# Patient Record
Sex: FEMALE | Race: BLACK OR AFRICAN AMERICAN | NOT HISPANIC OR LATINO | Employment: OTHER | URBAN - METROPOLITAN AREA
[De-identification: names, ages, dates, MRNs, and addresses within clinical notes are randomized per-mention and may not be internally consistent; named-entity substitution may affect disease eponyms.]

---

## 2022-01-18 ENCOUNTER — APPOINTMENT (OUTPATIENT)
Dept: GENERAL RADIOLOGY | Facility: HOSPITAL | Age: 72
End: 2022-01-18

## 2022-01-18 ENCOUNTER — HOSPITAL ENCOUNTER (INPATIENT)
Facility: HOSPITAL | Age: 72
LOS: 2 days | Discharge: NURSING, REHAB OR LONG-TERM CARE | End: 2022-01-20
Attending: EMERGENCY MEDICINE | Admitting: INTERNAL MEDICINE

## 2022-01-18 DIAGNOSIS — N18.6 CHRONIC KIDNEY DISEASE WITH END STAGE RENAL FAILURE ON DIALYSIS: ICD-10-CM

## 2022-01-18 DIAGNOSIS — E87.5 HYPERKALEMIA: Primary | ICD-10-CM

## 2022-01-18 DIAGNOSIS — Z99.2 CHRONIC KIDNEY DISEASE WITH END STAGE RENAL FAILURE ON DIALYSIS: ICD-10-CM

## 2022-01-18 DIAGNOSIS — J18.9 PNEUMONIA OF BOTH LUNGS DUE TO INFECTIOUS ORGANISM, UNSPECIFIED PART OF LUNG: ICD-10-CM

## 2022-01-18 DIAGNOSIS — Z78.9 DECREASED ACTIVITIES OF DAILY LIVING (ADL): ICD-10-CM

## 2022-01-18 DIAGNOSIS — R26.2 DIFFICULTY IN WALKING: ICD-10-CM

## 2022-01-18 LAB
ALBUMIN SERPL-MCNC: 3 G/DL (ref 3.5–5.2)
ALBUMIN/GLOB SERPL: 0.6 G/DL
ALP SERPL-CCNC: 81 U/L (ref 39–117)
ALT SERPL W P-5'-P-CCNC: 18 U/L (ref 1–33)
ANION GAP SERPL CALCULATED.3IONS-SCNC: 15.1 MMOL/L (ref 5–15)
AST SERPL-CCNC: 28 U/L (ref 1–32)
BASOPHILS # BLD AUTO: 0.03 10*3/MM3 (ref 0–0.2)
BASOPHILS NFR BLD AUTO: 0.5 % (ref 0–1.5)
BILIRUB SERPL-MCNC: 0.2 MG/DL (ref 0–1.2)
BUN SERPL-MCNC: 72 MG/DL (ref 8–23)
BUN/CREAT SERPL: 8.6 (ref 7–25)
CALCIUM SPEC-SCNC: 8.7 MG/DL (ref 8.6–10.5)
CHLORIDE SERPL-SCNC: 97 MMOL/L (ref 98–107)
CO2 SERPL-SCNC: 21.9 MMOL/L (ref 22–29)
CREAT SERPL-MCNC: 8.36 MG/DL (ref 0.57–1)
D-LACTATE SERPL-SCNC: 1 MMOL/L (ref 0.5–2)
DEPRECATED RDW RBC AUTO: 64.9 FL (ref 37–54)
EOSINOPHIL # BLD AUTO: 0.16 10*3/MM3 (ref 0–0.4)
EOSINOPHIL NFR BLD AUTO: 2.5 % (ref 0.3–6.2)
ERYTHROCYTE [DISTWIDTH] IN BLOOD BY AUTOMATED COUNT: 17.9 % (ref 12.3–15.4)
GFR SERPL CREATININE-BSD FRML MDRD: 6 ML/MIN/1.73
GFR SERPL CREATININE-BSD FRML MDRD: ABNORMAL ML/MIN/{1.73_M2}
GLOBULIN UR ELPH-MCNC: 5.2 GM/DL
GLUCOSE SERPL-MCNC: 101 MG/DL (ref 65–99)
HCT VFR BLD AUTO: 31 % (ref 34–46.6)
HGB BLD-MCNC: 9.5 G/DL (ref 12–15.9)
HOLD SPECIMEN: NORMAL
HOLD SPECIMEN: NORMAL
IMM GRANULOCYTES # BLD AUTO: 0.02 10*3/MM3 (ref 0–0.05)
IMM GRANULOCYTES NFR BLD AUTO: 0.3 % (ref 0–0.5)
LYMPHOCYTES # BLD AUTO: 1.16 10*3/MM3 (ref 0.7–3.1)
LYMPHOCYTES NFR BLD AUTO: 18.2 % (ref 19.6–45.3)
MAGNESIUM SERPL-MCNC: 1.9 MG/DL (ref 1.6–2.4)
MCH RBC QN AUTO: 30 PG (ref 26.6–33)
MCHC RBC AUTO-ENTMCNC: 30.6 G/DL (ref 31.5–35.7)
MCV RBC AUTO: 97.8 FL (ref 79–97)
MONOCYTES # BLD AUTO: 0.61 10*3/MM3 (ref 0.1–0.9)
MONOCYTES NFR BLD AUTO: 9.6 % (ref 5–12)
NEUTROPHILS NFR BLD AUTO: 4.4 10*3/MM3 (ref 1.7–7)
NEUTROPHILS NFR BLD AUTO: 68.9 % (ref 42.7–76)
NRBC BLD AUTO-RTO: 0 /100 WBC (ref 0–0.2)
PLATELET # BLD AUTO: 475 10*3/MM3 (ref 140–450)
PMV BLD AUTO: 9.5 FL (ref 6–12)
POTASSIUM SERPL-SCNC: 7.1 MMOL/L (ref 3.5–5.2)
PROT SERPL-MCNC: 8.2 G/DL (ref 6–8.5)
RBC # BLD AUTO: 3.17 10*6/MM3 (ref 3.77–5.28)
SARS-COV-2 RNA PNL SPEC NAA+PROBE: DETECTED
SODIUM SERPL-SCNC: 134 MMOL/L (ref 136–145)
TROPONIN T SERPL-MCNC: 0.25 NG/ML (ref 0–0.03)
WBC NRBC COR # BLD: 6.38 10*3/MM3 (ref 3.4–10.8)
WHOLE BLOOD HOLD SPECIMEN: NORMAL
WHOLE BLOOD HOLD SPECIMEN: NORMAL

## 2022-01-18 PROCEDURE — 80053 COMPREHEN METABOLIC PANEL: CPT | Performed by: EMERGENCY MEDICINE

## 2022-01-18 PROCEDURE — 25010000002 CEFEPIME PER 500 MG: Performed by: EMERGENCY MEDICINE

## 2022-01-18 PROCEDURE — 83735 ASSAY OF MAGNESIUM: CPT | Performed by: EMERGENCY MEDICINE

## 2022-01-18 PROCEDURE — 84484 ASSAY OF TROPONIN QUANT: CPT | Performed by: EMERGENCY MEDICINE

## 2022-01-18 PROCEDURE — 85025 COMPLETE CBC W/AUTO DIFF WBC: CPT | Performed by: EMERGENCY MEDICINE

## 2022-01-18 PROCEDURE — 5A1D70Z PERFORMANCE OF URINARY FILTRATION, INTERMITTENT, LESS THAN 6 HOURS PER DAY: ICD-10-PCS | Performed by: INTERNAL MEDICINE

## 2022-01-18 PROCEDURE — 94640 AIRWAY INHALATION TREATMENT: CPT

## 2022-01-18 PROCEDURE — U0004 COV-19 TEST NON-CDC HGH THRU: HCPCS | Performed by: EMERGENCY MEDICINE

## 2022-01-18 PROCEDURE — 83605 ASSAY OF LACTIC ACID: CPT | Performed by: EMERGENCY MEDICINE

## 2022-01-18 PROCEDURE — 71045 X-RAY EXAM CHEST 1 VIEW: CPT

## 2022-01-18 PROCEDURE — 99223 1ST HOSP IP/OBS HIGH 75: CPT | Performed by: INTERNAL MEDICINE

## 2022-01-18 PROCEDURE — 82962 GLUCOSE BLOOD TEST: CPT

## 2022-01-18 PROCEDURE — 93005 ELECTROCARDIOGRAM TRACING: CPT | Performed by: EMERGENCY MEDICINE

## 2022-01-18 PROCEDURE — 25010000002 CALCIUM GLUCONATE-NACL 1-0.675 GM/50ML-% SOLUTION: Performed by: EMERGENCY MEDICINE

## 2022-01-18 PROCEDURE — 87040 BLOOD CULTURE FOR BACTERIA: CPT | Performed by: EMERGENCY MEDICINE

## 2022-01-18 PROCEDURE — 94799 UNLISTED PULMONARY SVC/PX: CPT

## 2022-01-18 PROCEDURE — 99284 EMERGENCY DEPT VISIT MOD MDM: CPT

## 2022-01-18 RX ORDER — ACETAMINOPHEN 325 MG/1
650 TABLET ORAL EVERY 4 HOURS PRN
Status: DISCONTINUED | OUTPATIENT
Start: 2022-01-18 | End: 2022-01-21 | Stop reason: HOSPADM

## 2022-01-18 RX ORDER — ONDANSETRON 2 MG/ML
4 INJECTION INTRAMUSCULAR; INTRAVENOUS EVERY 6 HOURS PRN
Status: DISCONTINUED | OUTPATIENT
Start: 2022-01-18 | End: 2022-01-21 | Stop reason: HOSPADM

## 2022-01-18 RX ORDER — ACETAMINOPHEN 325 MG/1
650 TABLET ORAL EVERY 4 HOURS PRN
COMMUNITY

## 2022-01-18 RX ORDER — HEPARIN SODIUM 5000 [USP'U]/ML
5000 INJECTION, SOLUTION INTRAVENOUS; SUBCUTANEOUS EVERY 8 HOURS SCHEDULED
Status: DISCONTINUED | OUTPATIENT
Start: 2022-01-18 | End: 2022-01-18

## 2022-01-18 RX ORDER — AMOXICILLIN 250 MG
2 CAPSULE ORAL 2 TIMES DAILY
Status: DISCONTINUED | OUTPATIENT
Start: 2022-01-18 | End: 2022-01-21 | Stop reason: HOSPADM

## 2022-01-18 RX ORDER — HYDROXYZINE PAMOATE 25 MG/1
25 CAPSULE ORAL EVERY 6 HOURS PRN
COMMUNITY

## 2022-01-18 RX ORDER — ALBUTEROL SULFATE 2.5 MG/3ML
2.5 SOLUTION RESPIRATORY (INHALATION) ONCE
Status: COMPLETED | OUTPATIENT
Start: 2022-01-18 | End: 2022-01-18

## 2022-01-18 RX ORDER — ASPIRIN 81 MG/1
81 TABLET, CHEWABLE ORAL DAILY
COMMUNITY

## 2022-01-18 RX ORDER — TRAMADOL HYDROCHLORIDE 50 MG/1
50 TABLET ORAL DAILY PRN
COMMUNITY

## 2022-01-18 RX ORDER — ASPIRIN 81 MG/1
81 TABLET, CHEWABLE ORAL DAILY
Status: DISCONTINUED | OUTPATIENT
Start: 2022-01-18 | End: 2022-01-21 | Stop reason: HOSPADM

## 2022-01-18 RX ORDER — SODIUM CHLORIDE 0.9 % (FLUSH) 0.9 %
10 SYRINGE (ML) INJECTION AS NEEDED
Status: DISCONTINUED | OUTPATIENT
Start: 2022-01-18 | End: 2022-01-21 | Stop reason: HOSPADM

## 2022-01-18 RX ORDER — FAMOTIDINE 20 MG/1
40 TABLET, FILM COATED ORAL DAILY
Status: DISCONTINUED | OUTPATIENT
Start: 2022-01-18 | End: 2022-01-21 | Stop reason: HOSPADM

## 2022-01-18 RX ORDER — SODIUM CHLORIDE 0.9 % (FLUSH) 0.9 %
10 SYRINGE (ML) INJECTION AS NEEDED
Status: DISCONTINUED | OUTPATIENT
Start: 2022-01-18 | End: 2022-01-18

## 2022-01-18 RX ORDER — POLYETHYLENE GLYCOL 3350 17 G/17G
17 POWDER, FOR SOLUTION ORAL DAILY PRN
Status: DISCONTINUED | OUTPATIENT
Start: 2022-01-18 | End: 2022-01-21 | Stop reason: HOSPADM

## 2022-01-18 RX ORDER — POLYETHYLENE GLYCOL 3350 17 G/17G
17 POWDER, FOR SOLUTION ORAL DAILY
Status: DISCONTINUED | OUTPATIENT
Start: 2022-01-18 | End: 2022-01-21 | Stop reason: HOSPADM

## 2022-01-18 RX ORDER — CALCIUM GLUCONATE 20 MG/ML
1 INJECTION, SOLUTION INTRAVENOUS ONCE
Status: COMPLETED | OUTPATIENT
Start: 2022-01-18 | End: 2022-01-18

## 2022-01-18 RX ORDER — BISACODYL 10 MG
10 SUPPOSITORY, RECTAL RECTAL DAILY PRN
Status: DISCONTINUED | OUTPATIENT
Start: 2022-01-18 | End: 2022-01-21 | Stop reason: HOSPADM

## 2022-01-18 RX ORDER — ATORVASTATIN CALCIUM 40 MG/1
40 TABLET, FILM COATED ORAL DAILY
COMMUNITY

## 2022-01-18 RX ORDER — DEXTROSE MONOHYDRATE 25 G/50ML
25 INJECTION, SOLUTION INTRAVENOUS ONCE
Status: COMPLETED | OUTPATIENT
Start: 2022-01-18 | End: 2022-01-18

## 2022-01-18 RX ORDER — CEFEPIME 1 G/50ML
2 INJECTION, SOLUTION INTRAVENOUS ONCE
Status: COMPLETED | OUTPATIENT
Start: 2022-01-18 | End: 2022-01-18

## 2022-01-18 RX ORDER — CHOLECALCIFEROL (VITAMIN D3) 125 MCG
5 CAPSULE ORAL NIGHTLY PRN
Status: DISCONTINUED | OUTPATIENT
Start: 2022-01-18 | End: 2022-01-21 | Stop reason: HOSPADM

## 2022-01-18 RX ORDER — CALCIUM ACETATE 667 MG/1
1334 CAPSULE ORAL 3 TIMES DAILY
Status: DISCONTINUED | OUTPATIENT
Start: 2022-01-18 | End: 2022-01-21 | Stop reason: HOSPADM

## 2022-01-18 RX ORDER — CALCIUM ACETATE 667 MG/1
1334 CAPSULE ORAL 3 TIMES DAILY
COMMUNITY

## 2022-01-18 RX ORDER — ATORVASTATIN CALCIUM 40 MG/1
40 TABLET, FILM COATED ORAL NIGHTLY
Status: DISCONTINUED | OUTPATIENT
Start: 2022-01-18 | End: 2022-01-21 | Stop reason: HOSPADM

## 2022-01-18 RX ORDER — SODIUM CHLORIDE 0.9 % (FLUSH) 0.9 %
10 SYRINGE (ML) INJECTION EVERY 12 HOURS SCHEDULED
Status: DISCONTINUED | OUTPATIENT
Start: 2022-01-18 | End: 2022-01-21 | Stop reason: HOSPADM

## 2022-01-18 RX ORDER — BISACODYL 5 MG/1
5 TABLET, DELAYED RELEASE ORAL DAILY PRN
Status: DISCONTINUED | OUTPATIENT
Start: 2022-01-18 | End: 2022-01-21 | Stop reason: HOSPADM

## 2022-01-18 RX ADMIN — CEFEPIME 2 G: 1 INJECTION, SOLUTION INTRAVENOUS at 16:50

## 2022-01-18 RX ADMIN — ACETAMINOPHEN 650 MG: 325 TABLET ORAL at 21:47

## 2022-01-18 RX ADMIN — ALBUTEROL SULFATE 2.5 MG: 2.5 SOLUTION RESPIRATORY (INHALATION) at 16:06

## 2022-01-18 RX ADMIN — DEXTROSE MONOHYDRATE 25 G: 25 INJECTION, SOLUTION INTRAVENOUS at 16:12

## 2022-01-18 RX ADMIN — CALCIUM GLUCONATE 1 G: 20 INJECTION, SOLUTION INTRAVENOUS at 16:11

## 2022-01-18 NOTE — CONSULTS
[facility]   Consult Note    Patient Name: Shakira Hopson  : 1950  MRN: [id number]  Primary Care Physician:  Gopal Umana MD  Referring Physician: No ref. provider found  Date of admission: 2022    Subjective   Subjective     Reason for Consult/ Chief Complaint: Hyperkalemia    HPI:  Shakira Hopson is a 71 y.o. female with ESRD on hemodialysis.  She has been ESRD greater than 2 years based on reviewed records.  She is our established patient at [facility] dialysis [facility] and was sent for acute rehab at [facility].  Per [facility] staff her last hemodialysis treatment was 2021 prior to coming to rehab.  Dialysis was ordered and scheduled for yesterday, she did not get dialysis and she was sent to the ER for increased confusion and abdominal pain. She was found to have potassium of 7.1 in ER. She received emergent hyperkalemic treatment.  X-ray resulted findings consistent with multifocal pneumonia, right greater than left.  She is confused in the emergency room and when asked questions she is unable to answer.  She is intermittently moaning.    Review of Systems  Unable to obtain due to confusion and mental status.    Personal History     Past Medical History:   Diagnosis Date   • Anemia    • CHF (congestive heart failure) (HCC)    • Coronary artery disease    • Dementia (HCC)    • Diabetes mellitus (HCC)    • End stage renal disease (HCC)    • Hemodialysis patient (HCC)    • Hyperlipidemia    • Hypertension    • Myocardial infarction (HCC)    • Pneumonia        History reviewed. No pertinent surgical history.    Family History: family history is not on file. Otherwise pertinent FHx was reviewed and not pertinent to current issue.    Social History:  reports that she has never smoked. She has never used smokeless tobacco. She reports that she does not drink alcohol and does not use drugs.    Home Medications:  Canagliflozin, Polyethylene Glycol 3350,  acetaminophen, aspirin, atorvastatin, calcium acetate, epoetin saba, hydrOXYzine pamoate, insulin aspart, metoprolol tartrate, sacubitril-valsartan, and traMADol    Allergies:  Allergies   Allergen Reactions   • Codeine Unknown - Low Severity   • Pork-Derived Products Unknown - Low Severity       Objective    Objective     Vitals:   Temp:  [98.8 °F (37.1 °C)-99.5 °F (37.5 °C)] 98.8 °F (37.1 °C)  Heart Rate:  [] 103  Resp:  [16-25] 16  BP: (118-149)/(41-70) 118/41  Flow (L/min):  [3] 3    Physical Exam:             Constitutional:       Awake, disoriented, not answering questions.    Eyes:                       PERRLA, sclerae anicteric, no conjunctival injection   HEENT:                   Moist mucous membranes, no nasal or eye discharge, no throat congestion   Neck:                      Supple, no thyromegaly, no lymphadenopathy, trachea midline, no elevated JVD   Respiratory:           Clear to auscultation bilaterally, nonlabored respirations    Cardiovascular:     RRR, no murmurs, rubs, or gallops, palpable pedal pulses bilaterally, No bilateral ankle edema   Gastrointestinal:   Positive bowel sounds, soft, nontender, non-distended, no organomegaly   Musculoskeletal:  No clubbing or cyanosis to extremities, muscle wasting, joint swelling, muscle weakness   Neurologic:            Confused, strength symmetric in all extremities, Cranial Nerves grossly intact to confrontation, speech clear   Skin:                      No rashes, bruising, skin ulcers, petechiae or ecchymosis    Result Review    Result Review:  I have personally reviewed the results from the time of this admission to 1/19/2022 08:22 EST and agree with these findings:  [x]  Laboratory  [x]  Microbiology  [x]  Radiology  [x]  EKG/Telemetry   [x]  Cardiology/Vascular   []  Pathology  []  Old records  []  Other:      Assessment/Plan   Assessment / Plan     Active Hospital Problems:  Active Hospital Problems    Diagnosis    • Pneumonia due to  COVID-19 virus    • ESRD (end stage renal disease) on dialysis (HCC)    • Hyperkalemia    • Pneumonia        Plan:   Stat hemodialysis  Start Decadron  Continue IV antibiotics.    Labs tomorrow. '    Electronically signed by Juana Sauceda MD, 01/18/22, 3:47 PM EST.

## 2022-01-18 NOTE — H&P
[facility] HISTORY AND PHYSICAL  Date: 1/18/2022   Patient Name: Shakira Hopson  Primary Care Physician:  Gopal Umana MD  Date of admission: 1/18/2022    Subjective   Subjective     Chief Complaint: Hyperkalemia    HPI:    Shakira Hopson is a 71 y.o. female who is a chronic dialysis patient in [place] was at acute rehab, difficulty with dialysis staff, detected to have an elevated temperature and transferred to the emergency department.  Overdue for dialysis.  No other current history available, I am unable to find the paper chart and the patient is not oriented.  Per the ED physician the patient was recently mated to a facility for pneumonia but unclear what facility.  Unable to obtain further history.      Personal History     Past Medical History:  Past Medical History:   Diagnosis Date   • Anemia    • CHF (congestive heart failure) (HCC)    • Coronary artery disease    • Dementia (HCC)    • Diabetes mellitus (HCC)    • End stage renal disease (HCC)    • Hemodialysis patient (HCC)    • Hyperlipidemia    • Hypertension    • Myocardial infarction (HCC)    • Pneumonia          Past Surgical History:  Patient confused, unable to obtain    Family History:   Patient is confused, unable to obtain    Social History:   Patient is confused, unable to obtain    Home Medications:  Canagliflozin, Polyethylene Glycol 3350, acetaminophen, aspirin, atorvastatin, calcium acetate, epoetin saba, hydrOXYzine pamoate, insulin aspart, metoprolol tartrate, sacubitril-valsartan, and traMADol    Allergies:  Allergies   Allergen Reactions   • Codeine Unknown - Low Severity   • Pork-Derived Products Unknown - Low Severity       Review of Systems   Patient is confused, denies cough fever shortness of breath but is not a reliable historian.  14 point review of systems obtained and negative except as noted in HPI    Objective   Objective     Vitals:   Temp:  [99.1 °F (37.3 °C)] 99.1 °F (37.3 °C)  Heart  Rate:  [90-96] 90  Resp:  [20-25] 20  BP: (128-145)/(61-67) 128/63  Flow (L/min):  [3] 3    Physical Exam   General: NAD, WDWN elderly female lying on the bed comfortably  Eyes: Clear conjunctiva, PERRL EOMI  ENMT: mmm, normal oropharynx  Resp: CTAB, no wrr, good effort no dyspnea  CV: RRR no mrg, no LE edema, right upper extremity fistula with palpable thrill  GI: abdomen is soft, NT, ND, +bs  Neuro: Moves all extremities spontaneously, sensation intact  Psych: Alert, oriented to person but not place or time or situation      Result Review    Result Review:  I have personally reviewed the results from the time of this admission to 1/18/2022 17:51 EST and agree with these findings:  [x]  Laboratory  Potassium 7.1  Hemoglobin 9.5  [x]  Microbiology  COVID swab pending  [x]  Radiology  Chest x-ray: Multifocal pneumonia right greater than left  [x]  EKG/Telemetry sinus rhythm  []  Cardiology/Vascular   []  Pathology  []  Old records  []  Other:      Assessment/Plan   Assessment / Plan     Assessment:   Life-threatening hyperkalemia  ESRD overdue for dialysis  Multifocal pneumonia  Dementia with confusion  Diabetes  CHF    Plan:     Nephrology consulted from the emergency department, planning for emergent hemodialysis  Received empiric vancomycin and cefepime in the emergency department, reassess tomorrow before redosing  COVID-19 swab, airborne isolation until returns  [facility]  Resume home aspirin, statin, metoprolol, Entresto  Resume home Phos binders  Sliding-scale insulin for now, holding Invokana     DVT ppx: Heparin  Diet: Diabetic renal  Discussed with bedside RN and ER physician    Admission Status:  I believe this patient meets inpatient status.    Electronically signed by Vilma Elizondo MD, 01/18/22, 5:51 PM EST.

## 2022-01-18 NOTE — ED PROVIDER NOTES
Time: 2:59 PM EST  Arrived by: ambulance  Chief Complaint: Low-grade temperature, increased confusion  History provided by: [facility]  History is limited by: Poor historian    History of Present Illness:  Patient is a 71 y.o. year old female that presents to the emergency department from [facility] rehab where she was transferred from another hospital on January 15.  Patient admitted in the hospital for pneumonia.  Patient is also a dialysis patient.  The nursing home sent the patient today to the emergency department due to a low-grade fever of 99.9 with increased confusion.  They state the patient has not had dialysis since she is arrived there.  She was supposed to have it yesterday and the dialysis team did not come and again today so they felt they should send her to the emergency department.  Patient is alert but unable to answer any questions.                   HPI    Similar Symptoms Previously:  unknown  Recently seen: Recent hospitalization      Patient Care Team  Primary Care Provider: Gopal Umana MD    Past Medical History:     Allergies   Allergen Reactions   • Codeine Unknown - Low Severity   • Pork-Derived Products Unknown - Low Severity     Past Medical History:   Diagnosis Date   • Anemia    • CHF (congestive heart failure) (HCC)    • Coronary artery disease    • Dementia (HCC)    • Diabetes mellitus (HCC)    • End stage renal disease (HCC)    • Hemodialysis patient (HCC)    • Hyperlipidemia    • Hypertension    • Myocardial infarction (HCC)    • Pneumonia      History reviewed. No pertinent surgical history.  History reviewed. No pertinent family history.    Home Medications:  Prior to Admission medications    Medication Sig Start Date End Date Taking? Authorizing Provider   acetaminophen (TYLENOL) 325 MG tablet Take 650 mg by mouth Every 4 (Four) Hours As Needed for Mild Pain .   Yes Provider, MD Lorenzo   aspirin 81 MG chewable tablet Chew 81  "mg Daily.   Yes Lorenzo Mulligan MD   atorvastatin (LIPITOR) 40 MG tablet Take 40 mg by mouth Daily.   Yes Lorenoz Mulligan MD   Canagliflozin (INVOKANA) 100 MG tablet tablet Take 100 mg by mouth Daily.   Yes Lorenzo Mulligan MD   insulin aspart (novoLOG FLEXPEN) 100 UNIT/ML solution pen-injector sc pen Inject 1 Units under the skin into the appropriate area as directed 3 (Three) Times a Day With Meals. Sliding scale   Yes Lorenzo Mulligan MD   metoprolol tartrate (LOPRESSOR) 25 MG tablet Take 12.5 mg by mouth Every 8 (Eight) Hours.   Yes Lorenzo Mulligan MD   POLYETHYLENE GLYCOL 3350 PO Take 17 g by mouth Daily.   Yes Lorenzo Mulligan MD   sacubitril-valsartan (ENTRESTO) 24-26 MG tablet Take 1 tablet by mouth 2 (Two) Times a Day.   Yes Lorenzo Mulligan MD        Social History:   Social History     Tobacco Use   • Smoking status: Never Smoker   • Smokeless tobacco: Never Used   Substance Use Topics   • Alcohol use: Never   • Drug use: Never       Review of Systems:  Review of Systems   Unable to perform ROS: Dementia        Physical Exam:  /52 (BP Location: Right arm, Patient Position: Lying)   Pulse 84   Temp 98 °F (36.7 °C) (Oral)   Resp 18   Ht 167.6 cm (66\")   Wt 48.8 kg (107 lb 9.4 oz)   SpO2 98%   BMI 17.36 kg/m²     Physical Exam  Vitals and nursing note reviewed.   Constitutional:       Appearance: Normal appearance.   HENT:      Head: Normocephalic and atraumatic.   Cardiovascular:      Rate and Rhythm: Normal rate and regular rhythm.      Heart sounds: Normal heart sounds.   Pulmonary:      Effort: Pulmonary effort is normal.      Breath sounds: Rhonchi present.   Abdominal:      General: Abdomen is flat. Bowel sounds are normal.      Palpations: Abdomen is soft.   Musculoskeletal:         General: Normal range of motion.      Cervical back: Normal range of motion and neck supple.   Skin:     General: Skin is warm and dry.   Neurological:      General: No " focal deficit present.      Mental Status: She is alert and oriented to person, place, and time.   Psychiatric:         Mood and Affect: Mood normal.                Medications in the Emergency Department:  Medications   cefepime (MAXIPIME) IVPB 2 g (premix) in 0.9% NaCl (2 g Intravenous New Bag 1/18/22 1650)   calcium gluconate 1g/50ml 0.675% NaCl IV SOLN (0 g Intravenous Stopped 1/18/22 1645)   dextrose (D50W) (25 g/50 mL) IV injection 25 g (25 g Intravenous Given 1/18/22 1612)   albuterol (PROVENTIL) nebulizer solution 0.083% 2.5 mg/3mL (2.5 mg Nebulization Given 1/18/22 1606)        Labs  Lab Results (last 24 hours)     ** No results found for the last 24 hours. **           Imaging:  No Radiology Exams Resulted Within Past 24 Hours    Procedures:  Critical Care  Performed by: Filipe Murrell DO  Authorized by: Vilma Elizondo MD     Critical care provider statement:     Critical care time (minutes):  35    Critical care time was exclusive of:  Separately billable procedures and treating other patients    Critical care was necessary to treat or prevent imminent or life-threatening deterioration of the following conditions:  Metabolic crisis and renal failure    Critical care was time spent personally by me on the following activities:  Discussions with consultants, evaluation of patient's response to treatment, examination of patient, obtaining history from patient or surrogate, ordering and performing treatments and interventions, ordering and review of laboratory studies, ordering and review of radiographic studies, pulse oximetry, re-evaluation of patient's condition and review of old charts    I assumed direction of critical care for this patient from another provider in my specialty: no          Progress                            Medical Decision Making:  [facility]   Patient requires emergent dialysis, Dr. Sauceda, nephrology, will arrange. Patient given multiple meds for hyperkalemia. IV antibiotics  started.        Final diagnoses:   Hyperkalemia   Pneumonia of both lungs due to infectious organism, unspecified part of lung   Chronic kidney disease with end stage renal failure on dialysis (HCC)        Disposition:  ED Disposition     ED Disposition Condition Comment    Decision to Admit  Level of Care: Telemetry [5]   Diagnosis: Hyperkalemia [326976]   Isolate for COVID?: Yes [1]   Certification: I Certify That Inpatient Hospital Services Are Medically Necessary For Greater Than 2 Midnights            Documentation assistance provided by Filipe Murrell DO acting as scribe for Filipe Murrell DO. Information recorded by the scribe was done at my direction and has been verified and validated by me.        Filipe Murrell DO  01/21/22 8080

## 2022-01-18 NOTE — ED NOTES
PATIENTS CAPILLARY GLUCOSE WAS 68 MG/DL. VINAY ALFRED NOTIFIED. Jessi Marquez, PCT  01/18/22 7924

## 2022-01-19 PROBLEM — U07.1 PNEUMONIA DUE TO COVID-19 VIRUS: Status: ACTIVE | Noted: 2022-01-19

## 2022-01-19 PROBLEM — J12.82 PNEUMONIA DUE TO COVID-19 VIRUS: Status: ACTIVE | Noted: 2022-01-19

## 2022-01-19 LAB
ANION GAP SERPL CALCULATED.3IONS-SCNC: 11 MMOL/L (ref 5–15)
BASOPHILS # BLD AUTO: 0.01 10*3/MM3 (ref 0–0.2)
BASOPHILS NFR BLD AUTO: 0.2 % (ref 0–1.5)
BUN SERPL-MCNC: 21 MG/DL (ref 8–23)
BUN/CREAT SERPL: 4.8 (ref 7–25)
CALCIUM SPEC-SCNC: 8.5 MG/DL (ref 8.6–10.5)
CHLORIDE SERPL-SCNC: 102 MMOL/L (ref 98–107)
CO2 SERPL-SCNC: 25 MMOL/L (ref 22–29)
CREAT SERPL-MCNC: 4.35 MG/DL (ref 0.57–1)
DEPRECATED RDW RBC AUTO: 63 FL (ref 37–54)
EOSINOPHIL # BLD AUTO: 0.06 10*3/MM3 (ref 0–0.4)
EOSINOPHIL NFR BLD AUTO: 1.3 % (ref 0.3–6.2)
ERYTHROCYTE [DISTWIDTH] IN BLOOD BY AUTOMATED COUNT: 17.7 % (ref 12.3–15.4)
GFR SERPL CREATININE-BSD FRML MDRD: 12 ML/MIN/1.73
GFR SERPL CREATININE-BSD FRML MDRD: ABNORMAL ML/MIN/{1.73_M2}
GLUCOSE BLDC GLUCOMTR-MCNC: 147 MG/DL (ref 70–99)
GLUCOSE BLDC GLUCOMTR-MCNC: 206 MG/DL (ref 70–99)
GLUCOSE BLDC GLUCOMTR-MCNC: 56 MG/DL (ref 70–99)
GLUCOSE BLDC GLUCOMTR-MCNC: 65 MG/DL (ref 70–99)
GLUCOSE BLDC GLUCOMTR-MCNC: 68 MG/DL (ref 70–99)
GLUCOSE BLDC GLUCOMTR-MCNC: 82 MG/DL (ref 70–99)
GLUCOSE SERPL-MCNC: 113 MG/DL (ref 65–99)
HCT VFR BLD AUTO: 30.6 % (ref 34–46.6)
HGB BLD-MCNC: 9.3 G/DL (ref 12–15.9)
IMM GRANULOCYTES # BLD AUTO: 0.01 10*3/MM3 (ref 0–0.05)
IMM GRANULOCYTES NFR BLD AUTO: 0.2 % (ref 0–0.5)
IRON 24H UR-MRATE: 16 MCG/DL (ref 37–145)
IRON SATN MFR SERPL: 9 % (ref 20–50)
LYMPHOCYTES # BLD AUTO: 1.19 10*3/MM3 (ref 0.7–3.1)
LYMPHOCYTES NFR BLD AUTO: 25.4 % (ref 19.6–45.3)
MAGNESIUM SERPL-MCNC: 1.8 MG/DL (ref 1.6–2.4)
MCH RBC QN AUTO: 29.5 PG (ref 26.6–33)
MCHC RBC AUTO-ENTMCNC: 30.4 G/DL (ref 31.5–35.7)
MCV RBC AUTO: 97.1 FL (ref 79–97)
MONOCYTES # BLD AUTO: 0.53 10*3/MM3 (ref 0.1–0.9)
MONOCYTES NFR BLD AUTO: 11.3 % (ref 5–12)
NEUTROPHILS NFR BLD AUTO: 2.88 10*3/MM3 (ref 1.7–7)
NEUTROPHILS NFR BLD AUTO: 61.6 % (ref 42.7–76)
NRBC BLD AUTO-RTO: 0 /100 WBC (ref 0–0.2)
PHOSPHATE SERPL-MCNC: 3.8 MG/DL (ref 2.5–4.5)
PLATELET # BLD AUTO: 268 10*3/MM3 (ref 140–450)
PMV BLD AUTO: 9.4 FL (ref 6–12)
POTASSIUM SERPL-SCNC: 5.1 MMOL/L (ref 3.5–5.2)
QT INTERVAL: 381 MS
RBC # BLD AUTO: 3.15 10*6/MM3 (ref 3.77–5.28)
SODIUM SERPL-SCNC: 138 MMOL/L (ref 136–145)
TIBC SERPL-MCNC: 179 MCG/DL (ref 298–536)
TRANSFERRIN SERPL-MCNC: 120 MG/DL (ref 200–360)
WBC NRBC COR # BLD: 4.68 10*3/MM3 (ref 3.4–10.8)

## 2022-01-19 PROCEDURE — 83540 ASSAY OF IRON: CPT | Performed by: NURSE PRACTITIONER

## 2022-01-19 PROCEDURE — 99233 SBSQ HOSP IP/OBS HIGH 50: CPT | Performed by: INTERNAL MEDICINE

## 2022-01-19 PROCEDURE — 82962 GLUCOSE BLOOD TEST: CPT

## 2022-01-19 PROCEDURE — 85025 COMPLETE CBC W/AUTO DIFF WBC: CPT | Performed by: NURSE PRACTITIONER

## 2022-01-19 PROCEDURE — 97165 OT EVAL LOW COMPLEX 30 MIN: CPT

## 2022-01-19 PROCEDURE — 84466 ASSAY OF TRANSFERRIN: CPT | Performed by: NURSE PRACTITIONER

## 2022-01-19 PROCEDURE — 36415 COLL VENOUS BLD VENIPUNCTURE: CPT | Performed by: INTERNAL MEDICINE

## 2022-01-19 PROCEDURE — 97161 PT EVAL LOW COMPLEX 20 MIN: CPT

## 2022-01-19 PROCEDURE — 80048 BASIC METABOLIC PNL TOTAL CA: CPT | Performed by: INTERNAL MEDICINE

## 2022-01-19 PROCEDURE — 94799 UNLISTED PULMONARY SVC/PX: CPT

## 2022-01-19 PROCEDURE — 25010000002 DEXAMETHASONE PER 1 MG: Performed by: INTERNAL MEDICINE

## 2022-01-19 PROCEDURE — 84100 ASSAY OF PHOSPHORUS: CPT | Performed by: NURSE PRACTITIONER

## 2022-01-19 PROCEDURE — 83735 ASSAY OF MAGNESIUM: CPT | Performed by: NURSE PRACTITIONER

## 2022-01-19 RX ORDER — DEXAMETHASONE SODIUM PHOSPHATE 10 MG/ML
8 INJECTION INTRAMUSCULAR; INTRAVENOUS DAILY
Status: DISCONTINUED | OUTPATIENT
Start: 2022-01-19 | End: 2022-01-19

## 2022-01-19 RX ADMIN — Medication 10 ML: at 12:01

## 2022-01-19 RX ADMIN — POLYETHYLENE GLYCOL 3350 17 G: 17 POWDER, FOR SOLUTION ORAL at 09:51

## 2022-01-19 RX ADMIN — ASPIRIN 81 MG CHEWABLE TABLET 81 MG: 81 TABLET CHEWABLE at 09:50

## 2022-01-19 RX ADMIN — ATORVASTATIN CALCIUM 40 MG: 40 TABLET, FILM COATED ORAL at 02:30

## 2022-01-19 RX ADMIN — METOPROLOL TARTRATE 12.5 MG: 25 TABLET, FILM COATED ORAL at 09:50

## 2022-01-19 RX ADMIN — DEXAMETHASONE SODIUM PHOSPHATE 8 MG: 10 INJECTION INTRAMUSCULAR; INTRAVENOUS at 09:50

## 2022-01-19 RX ADMIN — METOPROLOL TARTRATE 12.5 MG: 25 TABLET, FILM COATED ORAL at 18:00

## 2022-01-19 RX ADMIN — CALCIUM ACETATE 1334 MG: 667 CAPSULE ORAL at 20:49

## 2022-01-19 RX ADMIN — CALCIUM ACETATE 1334 MG: 667 CAPSULE ORAL at 09:49

## 2022-01-19 RX ADMIN — SENNOSIDES AND DOCUSATE SODIUM 2 TABLET: 50; 8.6 TABLET ORAL at 09:50

## 2022-01-19 RX ADMIN — CALCIUM ACETATE 1334 MG: 667 CAPSULE ORAL at 17:43

## 2022-01-19 RX ADMIN — ATORVASTATIN CALCIUM 40 MG: 40 TABLET, FILM COATED ORAL at 20:49

## 2022-01-19 RX ADMIN — SACUBITRIL AND VALSARTAN 1 TABLET: 24; 26 TABLET, FILM COATED ORAL at 09:49

## 2022-01-19 RX ADMIN — METOPROLOL TARTRATE 12.5 MG: 25 TABLET, FILM COATED ORAL at 02:30

## 2022-01-19 RX ADMIN — Medication 5 MG: at 21:09

## 2022-01-19 RX ADMIN — SACUBITRIL AND VALSARTAN 1 TABLET: 24; 26 TABLET, FILM COATED ORAL at 20:49

## 2022-01-19 RX ADMIN — FAMOTIDINE 40 MG: 20 TABLET ORAL at 09:50

## 2022-01-19 RX ADMIN — ACETAMINOPHEN 650 MG: 325 TABLET ORAL at 21:59

## 2022-01-19 NOTE — SIGNIFICANT NOTE
01/19/22 1020   Wound 01/18/22 1815 Other (See comments) coccyx Pressure Injury   Placement Date/Time: 01/18/22 1815   Present on Hospital Admission: Yes  Side: (c) Other (See comments)  Location: coccyx  Primary Wound Type: Pressure Injury  Stage, Pressure Injury : Stage 2   Dressing Appearance intact; moist drainage   Base moist; red; slough   Red (%), Wound Tissue Color 25   Yellow (%), Wound Tissue Color 75   Periwound blanchable; redness   Periwound Temperature warm   Periwound Skin Turgor soft   Edges open   Wound Length (cm) 3 cm  (1.4 cm rt gluteal)   Wound Width (cm) 3 cm  (0.8cm rt gluteal)   Drainage Characteristics/Odor serosanguineous; yellow   Drainage Amount small   Care, Wound cleansed with; irrigated with; sterile normal saline   Dressing Care dressing applied; border dressing; hydrofiber; silver impregnated; silicone   Periwound Care absorptive dressing applied   Wound Consult: Patient admitted with ESRD on HD. Hx of Anemia, CHF, CAD, Dementia, DM, ESRD, HD, HLD, HTN, MI. Patient is confused and resisting turns and treatment but is agreeable once she understood that she was incontinent of stool. Frequent redirecting needed to allow for completion of tasks. Dressing to Coccyx was intact with moist drainage. Dressing removed. Medial aspect of coccyx with Stage III pressure injury as measured. Wound bed with yellow slough and red moist tissue. Right gluteal aspect Stage II with red moist tissue. Cleansed all areas with NS and gauze. Silver impregnated hydrofiber applied over open wounds and then secured with silicone border dressing. Patient cleaned and linens changed. Patient repositioned to left side with heels elevated. Recommending daily dressing to coccyx with continued skin protection and moisture prevention.   Cristel Hallman RN

## 2022-01-19 NOTE — PAYOR COMM NOTE
"Aron Hopson (71 y.o. Female)             Date of Birth Social Security Number Address Home Phone MRN    1950  [id number] [facility] [place] [address] [phone] [id number]    Sikh Marital Status             Unknown Unknown       Admission Date Admission Type Admitting Provider Attending Provider Department, Room/Bed    1/18/22 Emergency Vilma Elizondo MD Fisher, Valerie S, MD [address], 3008/1    Discharge Date Discharge Disposition Discharge Destination                         Attending Provider: Vilma Elizondo MD    Allergies: Codeine, Pork-derived Products    Isolation: Contact Air   Infection: COVID (confirmed) (01/18/22)   Code Status: CPR   Advance Care Planning Activity    Ht: 167.6 cm (66\")   Wt: 48.8 kg (107 lb 9.4 oz)    Admission Cmt: None   Principal Problem: None                Active Insurance as of 1/18/2022     Primary Coverage     Payor Plan Insurance Group Employer/Plan Group    ANTHEM MEDICARE REPLACEMENT ANTH MEDICARE ADVANTAGE [id number]     Payor Plan Address Payor Plan Phone Number Payor Plan Fax Number Effective Dates    [address] [phone]  1/1/2021 - None Entered    [facility] [id number]       Subscriber Name Subscriber Birth Date Member ID       ARON HOPSON 1950 [id number]                 Emergency Contacts      (Rel.) Home Phone Work Phone Mobile Phone    Kerrie Briscoe (Relative) -- -- [phone]              Pneumonia RRG - Inpatient Care by Genet Sapp RN         Met (Selected): Reviewed on 1/18/2022 by Genet Sapp RN       Created Using Review Status Review Entered   Ginx Completed 1/18/2022 21:54       Criteria Set Name - Subset   Pneumonia RRG - Inpatient Care       Selected?   Yes - Inpatient Care is selected for the Pneumonia RRG criteria set.      Criteria Review      Inpatient Care    Most Recent : Genet Sapp Most Recent Date: 1/18/2022 21:54:55 EST    (X) " Admission is indicated for  1 or more  of the following  [D] [E]:       (X) Moderate-risk-category or high-risk-category patient [C] (Pneumonia Severity Index class IV       or V, or CURB-65 score of 3 or greater)       1/18/2022 21:54:55 EST by Genet Sapp         CURSASCHA 65 score=3    Notes:    1/18/2022 21:54:55 EST by Genet Sapp    Subject: Admission    Pt is acute on chronic renal failure.Is chronic dialysis patient in [place] was at acute rehab, difficulty with dialysis staff. Pt. is overdue for dialysis. K+=7.1; Creat= 8.36; BUN=72 GFR=6      Patient is confused, denies cough fever shortness of breath but is not a reliable historian.      CXR= Multifocal pneumonia, right much greater than left.  Differential includes typical and atypical pneumonia. Pt COVID 19 test positive. Temp= 99.5; Resp=25; HR+ 90s; b/p=134/61      Pt has hx MI & CAD. Trop T=0.247          Khan: NPI [id number] Tax ID [id number]  Problem List           Codes Noted - [facility]       Hospital    ESRD (end stage renal disease) on dialysis ([facility]) ICD-10-CM: N18.6, Z99.2  ICD-9-CM: 585.6, V45.11 1/18/2022 - Present    Hyperkalemia ICD-10-CM: E87.5  ICD-9-CM: 276.7 1/18/2022 - Present    Pneumonia ICD-10-CM: J18.9  ICD-9-CM: 486 1/18/2022 - Present             History & Physical      Vilma Elizondo MD at 01/18/22 [address] [place] [address] HISTORY AND PHYSICAL  Date: 1/18/2022   Patient Name: Shakira Hopson  Primary Care Physician:  Gopal Umana MD  Date of admission: 1/18/2022    Subjective   Subjective     Chief Complaint: Hyperkalemia    HPI:    Shakira Hopson is a 71 y.o. female who is a chronic dialysis patient in [place] was at acute rehab, difficulty with dialysis staff, detected to have an elevated temperature and transferred to the emergency department.  Overdue for dialysis.  No other current history available, I am unable to find the paper chart and the patient is not oriented.  Per the ED  physician the patient was recently mated to a facility for pneumonia but unclear what facility.  Unable to obtain further history.      Personal History     Past Medical History:  Past Medical History:   Diagnosis Date   • Anemia    • CHF (congestive heart failure) (HCC)    • Coronary artery disease    • Dementia (HCC)    • Diabetes mellitus (HCC)    • End stage renal disease (HCC)    • Hemodialysis patient (HCC)    • Hyperlipidemia    • Hypertension    • Myocardial infarction (HCC)    • Pneumonia          Past Surgical History:  Patient confused, unable to obtain    Family History:   Patient is confused, unable to obtain    Social History:   Patient is confused, unable to obtain    Home Medications:  Canagliflozin, Polyethylene Glycol 3350, acetaminophen, aspirin, atorvastatin, calcium acetate, epoetin saba, hydrOXYzine pamoate, insulin aspart, metoprolol tartrate, sacubitril-valsartan, and traMADol    Allergies:  Allergies   Allergen Reactions   • Codeine Unknown - Low Severity   • Pork-Derived Products Unknown - Low Severity       Review of Systems   Patient is confused, denies cough fever shortness of breath but is not a reliable historian.  14 point review of systems obtained and negative except as noted in HPI    Objective   Objective     Vitals:   Temp:  [99.1 °F (37.3 °C)] 99.1 °F (37.3 °C)  Heart Rate:  [90-96] 90  Resp:  [20-25] 20  BP: (128-145)/(61-67) 128/63  Flow (L/min):  [3] 3    Physical Exam   General: NAD, WDWN elderly female lying on the bed comfortably  Eyes: Clear conjunctiva, PERRL EOMI  ENMT: mmm, normal oropharynx  Resp: CTAB, no wrr, good effort no dyspnea  CV: RRR no mrg, no LE edema, right upper extremity fistula with palpable thrill  GI: abdomen is soft, NT, ND, +bs  Neuro: Moves all extremities spontaneously, sensation intact  Psych: Alert, oriented to person but not place or time or situation      Result Review    Result Review:  I have personally reviewed the results from the time of  this admission to 1/18/2022 17:51 EST and agree with these findings:  [x]  Laboratory  Potassium 7.1  Hemoglobin 9.5  [x]  Microbiology  COVID swab pending  [x]  Radiology  Chest x-ray: Multifocal pneumonia right greater than left  [x]  EKG/Telemetry sinus rhythm  []  Cardiology/Vascular   []  Pathology  []  Old records  []  Other:      Assessment/Plan   Assessment / Plan     Assessment:   Life-threatening hyperkalemia  ESRD overdue for dialysis  Multifocal pneumonia  Dementia with confusion  Diabetes  CHF    Plan:     Nephrology consulted from the emergency department, planning for emergent hemodialysis  Received empiric vancomycin and cefepime in the emergency department, reassess tomorrow before redosing  COVID-19 swab, airborne isolation until returns  Need to obtain records from [facility] to determine which hospital she came from  Resume home aspirin, statin, metoprolol, Entresto  Resume home Phos binders  Sliding-scale insulin for now, holding Invokana     DVT ppx: Heparin  Diet: Diabetic renal  Discussed with bedside RN and ER physician    Admission Status:  I believe this patient meets inpatient status.    Electronically signed by Vilma Elizondo MD, 01/18/22, 5:51 PM EST.       Electronically signed by Vilma Elizondo MD at 01/18/22 1801          Emergency Department Notes      Jessi Soria PCT at 01/18/22 1617        PATIENTS CAPILLARY GLUCOSE WAS 68 MG/DL. VINAY ALFRED NOTIFIED. Jessi Marquez, BRYSON  01/18/22 1617      Electronically signed by Jessi Soria PCT at 01/18/22 1617       Vital Signs (last day)     Date/Time Temp Temp src Pulse Resp BP Patient Position SpO2    01/18/22 1955 99.4 (37.4) Oral -- 16 -- -- --    01/18/22 1928 -- -- 92 16 -- -- 94    01/18/22 1827 99.5 (37.5) Oral 94 16 149/70 Lying 95    01/18/22 1744 -- -- 90 20 128/63 Lying 94    01/18/22 1645 -- -- 93 -- 145/67 -- 96    01/18/22 1611 -- -- 91 22 -- -- 96    01/18/22 1545 -- -- 96 -- -- -- 93     01/18/22 1513 -- -- -- -- 134/61 Lying --    01/18/22 14:44:23 99.1 (37.3) Oral 92 25 -- -- 95          Oxygen Therapy (last day)     Date/Time SpO2 Device (Oxygen Therapy) Flow (L/min) Oxygen Concentration (%) ETCO2 (mmHg)    01/18/22 1928 94 room air -- -- --    01/18/22 1827 95 room air -- -- --    01/18/22 1744 94 nasal cannula 3 -- --    01/18/22 1645 96 -- -- -- --    01/18/22 1611 96 room air -- -- --    01/18/22 1545 93 -- -- -- --    01/18/22 14:44:23 95 room air -- -- --          Intake & Output (last day)       01/18 0701  01/19 0700    P.O. 240    IV Piggyback 50    Total Intake(mL/kg) 290 (5.9)    Net +290               Facility-Administered Medications as of 1/18/2022   Medication Dose Route Frequency Provider Last Rate Last Admin   • acetaminophen (TYLENOL) tablet 650 mg  650 mg Oral Q4H PRN Vilma Elizondo MD   650 mg at 01/18/22 2147   • [COMPLETED] albuterol (PROVENTIL) nebulizer solution 0.083% 2.5 mg/3mL  2.5 mg Nebulization Once Lepora, Filipe, DO   2.5 mg at 01/18/22 1606   • aspirin chewable tablet 81 mg  81 mg Oral Daily Vilma Elizondo MD       • atorvastatin (LIPITOR) tablet 40 mg  40 mg Oral Nightly Vilma Elizondo MD       • sennosides-docusate (PERICOLACE) 8.6-50 MG per tablet 2 tablet  2 tablet Oral BID Vilma Elizondo MD        And   • polyethylene glycol (MIRALAX) packet 17 g  17 g Oral Daily PRN Vilma Elizondo MD        And   • bisacodyl (DULCOLAX) EC tablet 5 mg  5 mg Oral Daily PRN Vilma Elizondo MD        And   • bisacodyl (DULCOLAX) suppository 10 mg  10 mg Rectal Daily PRN Vilma Elizondo MD       • calcium acetate (PHOS BINDER)) capsule 1,334 mg  1,334 mg Oral TID Vilma Elizondo MD       • [COMPLETED] calcium gluconate 1g/50ml 0.675% NaCl IV SOLN  1 g Intravenous Once Filipe Murrell DO   Stopped at 01/18/22 1645   • [COMPLETED] cefepime (MAXIPIME) IVPB 2 g (premix) in 0.9% NaCl  2 g Intravenous Once Filipe Murrell DO   2 g at 01/18/22 1650   • [COMPLETED]  dextrose (D50W) (25 g/50 mL) IV injection 25 g  25 g Intravenous Once Filipe Murrell, DO   25 g at 01/18/22 1612   • famotidine (PEPCID) tablet 40 mg  40 mg Oral Daily Vilma Elizondo MD       • insulin lispro (humaLOG) injection 0-7 Units  0-7 Units Subcutaneous TID AC Vilma Elizondo MD       • insulin regular (humuLIN R,novoLIN R) injection 10 Units  10 Units Intravenous Once Filipe Murrell DO       • melatonin tablet 5 mg  5 mg Oral Nightly PRN Vilma Elizondo MD       • metoprolol tartrate (LOPRESSOR) tablet 12.5 mg  12.5 mg Oral Q8H Vilma Elizondo MD       • ondansetron (ZOFRAN) injection 4 mg  4 mg Intravenous Q6H PRN Vilma Elizondo MD       • polyethylene glycol (MIRALAX) packet 17 g  17 g Oral Daily Vilma Elizondo MD       • sacubitril-valsartan (ENTRESTO) 24-26 MG tablet 1 tablet  1 tablet Oral BID Vilma Elizondo MD       • sodium chloride 0.9 % flush 10 mL  10 mL Intravenous Q12H Vilma Elizondo MD       • sodium chloride 0.9 % flush 10 mL  10 mL Intravenous PRN Vilma Elizondo MD       • vancomycin 1000 mg/250 mL 0.9% NS IVPB ([facility])  20 mg/kg Intravenous Once Filipe Murrell,            Lab Results (last 24 hours)     Procedure Component Value Units Date/Time    COVID-19,APTIMA PANTHER(DES),[facility], NP/OP SWAB IN UTM/VTM/SALINE TRANSPORT MEDIA,24 HR TAT - Swab, Nasopharynx [[id number]]  (Abnormal) Collected: 01/18/22 1620    Specimen: Swab from Nasopharynx Updated: 01/18/22 2114     COVID19 Detected    Narrative:      Fact sheet for providers: https://www.fda.gov/media/147348/download     Fact sheet for patients: https://www.fda.gov/media/231582/download    Test performed by RT PCR.    Comprehensive Metabolic Panel [[id number]]  (Abnormal) Collected: 01/18/22 1443    Specimen: Blood Updated: 01/18/22 1529     Glucose 101 mg/dL      BUN 72 mg/dL      Creatinine 8.36 mg/dL      Sodium 134 mmol/L      Potassium 7.1 mmol/L      Comment: Slight hemolysis detected by analyzer.  Results may be affected.        Chloride 97 mmol/L      CO2 21.9 mmol/L      Calcium 8.7 mg/dL      Total Protein 8.2 g/dL      Albumin 3.00 g/dL      ALT (SGPT) 18 U/L      AST (SGOT) 28 U/L      Comment: Slight hemolysis detected by analyzer. Results may be affected.        Alkaline Phosphatase 81 U/L      Total Bilirubin 0.2 mg/dL      eGFR Non  Amer --     Comment: <15 Indicative of kidney failure.        eGFR  African Amer 6 mL/min/1.73      Comment: <15 Indicative of kidney failure.        Globulin 5.2 gm/dL      A/G Ratio 0.6 g/dL      BUN/Creatinine Ratio 8.6     Anion Gap 15.1 mmol/L     Narrative:      GFR Normal >60  Chronic Kidney Disease <60  Kidney Failure <15      Troponin [[id number]]  (Abnormal) Collected: 01/18/22 1443    Specimen: Blood Updated: 01/18/22 1529     Troponin T 0.247 ng/mL     Narrative:      Troponin T Reference Range:  <= 0.03 ng/mL-   Negative for AMI  >0.03 ng/mL-     Abnormal for myocardial necrosis.  Clinicians would have to utilize clinical acumen, EKG, Troponin and serial changes to determine if it is an Acute Myocardial Infarction or myocardial injury due to an underlying chronic condition.       Results may be falsely decreased if patient taking Biotin.      Magnesium [[id number]]  (Normal) Collected: 01/18/22 1443    Specimen: Blood Updated: 01/18/22 1518     Magnesium 1.9 mg/dL     Lactic Acid, Plasma [[id number]]  (Normal) Collected: 01/18/22 1452    Specimen: Blood Updated: 01/18/22 1516     Lactate 1.0 mmol/L     [place] Draw [[id number]] Collected: 01/18/22 1443    Specimen: Blood Updated: 01/18/22 1458    Narrative:      The following orders were created for panel order [place] Draw.  Procedure                               Abnormality         Status                     ---------                               -----------         ------                     Green Top (Gel)[[id number]]                                  Final result               Lavender Top[[id number]]                                      Final result               Gold Top - SST[[id number]]                                   Final result               Light Blue Top[[id number]]                                   Final result                 Please view results for these tests on the individual orders.    Gold Top - SST [[id number]] Collected: 01/18/22 1443    Specimen: Blood Updated: 01/18/22 1458     Extra Tube Hold for add-ons.     Comment: Auto resulted.       Light Blue Top [[id number]] Collected: 01/18/22 1443    Specimen: Blood Updated: 01/18/22 1458     Extra Tube hold for add-on     Comment: Auto resulted       Green Top (Gel) [[id number]] Collected: 01/18/22 1443    Specimen: Blood Updated: 01/18/22 1458     Extra Tube Hold for add-ons.     Comment: Auto resulted.       Lavender Top [[id number]] Collected: 01/18/22 1443    Specimen: Blood Updated: 01/18/22 1458     Extra Tube hold for add-on     Comment: Auto resulted       CBC & Differential [[id number]]  (Abnormal) Collected: 01/18/22 1443    Specimen: Blood Updated: 01/18/22 1458    Narrative:      The following orders were created for panel order CBC & Differential.  Procedure                               Abnormality         Status                     ---------                               -----------         ------                     CBC Auto Differential[[id number]]        Abnormal            Final result                 Please view results for these tests on the individual orders.    CBC Auto Differential [[id number]]  (Abnormal) Collected: 01/18/22 1443    Specimen: Blood Updated: 01/18/22 1458     WBC 6.38 10*3/mm3      RBC 3.17 10*6/mm3      Hemoglobin 9.5 g/dL      Hematocrit 31.0 %      MCV 97.8 fL      MCH 30.0 pg      MCHC 30.6 g/dL      RDW 17.9 %      RDW-SD 64.9 fl      MPV 9.5 fL      Platelets 475 10*3/mm3      Neutrophil % 68.9 %      Lymphocyte % 18.2 %      Monocyte % 9.6 %      Eosinophil % 2.5 %      Basophil % 0.5 %      Immature Grans %  0.3 %      Neutrophils, Absolute 4.40 10*3/mm3      Lymphocytes, Absolute 1.16 10*3/mm3      Monocytes, Absolute 0.61 10*3/mm3      Eosinophils, Absolute 0.16 10*3/mm3      Basophils, Absolute 0.03 10*3/mm3      Immature Grans, Absolute 0.02 10*3/mm3      nRBC 0.0 /100 WBC     Blood Culture - Blood, Arm, Left [[id number]] Collected: 01/18/22 1443    Specimen: Blood from Arm, Left Updated: 01/18/22 1450        Imaging Results (Last 24 Hours)     Procedure Component Value Units Date/Time    XR Chest 1 View [[id number]] Collected: 01/18/22 1441     Updated: 01/18/22 1444    Narrative:      PROCEDURE: XR CHEST 1 VW     COMPARISON: None     INDICATIONS: WEAKNESS, DIZZINESS, AMS     FINDINGS:   There are multifocal opacities, predominantly with on the right side, which appear to be both   interstitial and ground-glass.  There is also suggestion of left basilar opacity.  Cardiomegaly is   noted.  No evidence of pneumothorax.  Regional skeleton is unremarkable.       Impression:         1. Findings consistent with multifocal pneumonia, right much greater than left.  Differential   includes typical and atypical pneumonia.                  JAVIER MENDEZ MD         Electronically Signed and Approved By: JAVIER MENDEZ MD on 1/18/2022 at 14:41                         Orders (last 24 hrs)      Start     Ordered    01/19/22 0600  CBC (No Diff)  Morning Draw         01/18/22 1801 01/19/22 0600  Basic Metabolic Panel  Morning Draw         01/18/22 1801 01/19/22 0600  Iron Profile  Morning Draw         01/18/22 1817    01/19/22 0600  CBC & Differential  Morning Draw         01/18/22 1817    01/19/22 0600  Magnesium  Morning Draw         01/18/22 1817    01/19/22 0600  Phosphorus  Morning Draw         01/18/22 1817    01/18/22 2200  Incentive Spirometry  Every 4 Hours While Awake       01/18/22 1815 01/18/22 2200  heparin (porcine) 5000 UNIT/ML injection 5,000 Units  Every 8 Hours Scheduled,   Status:  Discontinued          "01/18/22 1815    01/18/22 2200  POC Glucose 4x Daily AC & at Bedtime  4 Times Daily Before Meals & at Bedtime      Comments: If bedtime blood glucose is greater than 350 mg/dl, call MD.      01/18/22 1759 01/18/22 2100  sodium chloride 0.9 % flush 10 mL  Every 12 Hours Scheduled         01/18/22 1815 01/18/22 2100  sennosides-docusate (PERICOLACE) 8.6-50 MG per tablet 2 tablet  2 Times Daily        \"And\" Linked Group Details    01/18/22 1815 01/18/22 2100  atorvastatin (LIPITOR) tablet 40 mg  Nightly         01/18/22 1758 01/18/22 2100  calcium acetate (PHOS BINDER)) capsule 1,334 mg  3 Times Daily         01/18/22 1758 01/18/22 2100  sacubitril-valsartan (ENTRESTO) 24-26 MG tablet 1 tablet  2 Times Daily         01/18/22 1758 01/18/22 2000  Vital Signs  Every 4 Hours       01/18/22 1815 01/18/22 1927  Consult to Wound / Ostomy Care  Once         01/18/22 1927 01/18/22 1915  famotidine (PEPCID) tablet 40 mg  Daily         01/18/22 1815 01/18/22 1900  aspirin chewable tablet 81 mg  Daily         01/18/22 1758 01/18/22 1837  PT Consult: Eval & Treat Discharge Placement Assessment, Functional Mobility Below Baseline  Once         01/18/22 1836 01/18/22 1837  OT Consult: Eval & Treat  Once         01/18/22 1836 01/18/22 1816  Inpatient Nephrology Consult  STAT        Specialty:  Nephrology  Provider:  Juana Sauceda MD    01/18/22 1815 01/18/22 1816  Telemetry - Maintain IV Access  Continuous,   Status:  Canceled         01/18/22 1815 01/18/22 1816  Continuous Cardiac Monitoring  Continuous         01/18/22 1815 01/18/22 1816  Intake & Output  Every Shift       01/18/22 1815 01/18/22 1816  Weigh Patient  Once         01/18/22 1815 01/18/22 1816  Oxygen Therapy- Nasal Cannula; Titrate for SPO2: 88%  Continuous         01/18/22 1815 01/18/22 1816  Insert Peripheral IV  Once         01/18/22 1815 01/18/22 1816  Saline Lock & Maintain IV Access  Continuous         " "01/18/22 1815    01/18/22 1816  Fall Precautions  Continuous         01/18/22 1815    01/18/22 1816  Diet Regular; Cardiac, Consistent Carbohydrate, Renal  Diet Effective Now         01/18/22 1815 01/18/22 1815  ondansetron (ZOFRAN) injection 4 mg  Every 6 Hours PRN         01/18/22 1815    01/18/22 1815  sodium chloride 0.9 % flush 10 mL  As Needed         01/18/22 1815    01/18/22 1815  polyethylene glycol (MIRALAX) packet 17 g  Daily PRN        \"And\" Linked Group Details    01/18/22 1815    01/18/22 1815  bisacodyl (DULCOLAX) EC tablet 5 mg  Daily PRN        \"And\" Linked Group Details    01/18/22 1815    01/18/22 1815  bisacodyl (DULCOLAX) suppository 10 mg  Daily PRN        \"And\" Linked Group Details    01/18/22 1815 01/18/22 1815  melatonin tablet 5 mg  Nightly PRN         01/18/22 1815    01/18/22 1815  insulin lispro (humaLOG) injection 0-7 Units  3 Times Daily Before Meals         01/18/22 1759    01/18/22 1814  acetaminophen (TYLENOL) tablet 650 mg  Every 4 Hours PRN         01/18/22 1814 01/18/22 1800  metoprolol tartrate (LOPRESSOR) tablet 12.5 mg  Every 8 Hours         01/18/22 1758    01/18/22 1800  polyethylene glycol (MIRALAX) packet 17 g  Daily         01/18/22 1758    01/18/22 1651  Code Status and Medical Interventions:  Continuous         01/18/22 1651    01/18/22 1624  Inpatient Admission  Once         01/18/22 1623    01/18/22 1553  Hemodialysis Inpatient  STAT         01/18/22 1552    01/18/22 1552  Inpatient Hospitalist Consult  Once,   Status:  Canceled        Specialty:  Hospitalist  Provider:  Vilma Elizondo MD    01/18/22 1551    01/18/22 1552  Vital Signs Per Hospital Policy  Per Hospital Policy         01/18/22 1552    01/18/22 1552  Pulse Oximetry,  Spot  Once         01/18/22 1552    01/18/22 1545  calcium gluconate 1g/50ml 0.675% NaCl IV SOLN  Once         01/18/22 1538    01/18/22 1545  insulin regular (humuLIN R,novoLIN R) injection 10 Units  Once         01/18/22 1538 "    01/18/22 1545  dextrose (D50W) (25 g/50 mL) IV injection 25 g  Once         01/18/22 1538    01/18/22 1545  albuterol (PROVENTIL) nebulizer solution 0.083% 2.5 mg/3mL  Once         01/18/22 1538    01/18/22 1539  Inpatient Nephrology Consult  STAT,   Status:  Canceled        Specialty:  Nephrology  Provider:  Juana Sauceda MD    01/18/22 1538    01/18/22 1500  vancomycin 1000 mg/250 mL 0.9% NS IVPB ([facility])  Once         01/18/22 1448    01/18/22 1500  cefepime (MAXIPIME) IVPB 2 g (premix) in 0.9% NaCl  Once         01/18/22 1448    01/18/22 1447  COVID-19,APTIMA PANTHER(DES), JERALD/ RADHA, NP/OP SWAB IN UTM/VTM/SALINE TRANSPORT MEDIA,24 HR TAT - Swab, Nasopharynx  Once         01/18/22 1448    01/18/22 1443  Lactic Acid, Plasma  STAT         01/18/22 1442    01/18/22 1443  Blood Culture - Blood, Arm, Left  Once         01/18/22 1442    01/18/22 1401  Urinalysis With Microscopic If Indicated (No Culture) - Urine, Clean Catch  Once,   Status:  Canceled         01/18/22 1400    01/18/22 1400  NPO Diet  Diet Effective Now,   Status:  Canceled         01/18/22 1400    01/18/22 1400  Undress and Gown  Once         01/18/22 1400    01/18/22 1400  Cardiac monitoring  Per Hospital Policy,   Status:  Canceled         01/18/22 1400    01/18/22 1400  Continuous Pulse Oximetry  Continuous         01/18/22 1400    01/18/22 1400  Vital signs  Per Hospital Policy         01/18/22 1400    01/18/22 1400  Orthostatic blood pressure  Once,   Status:  Canceled         01/18/22 1400    01/18/22 1400  Fall precautions  Continuous         01/18/22 1400    01/18/22 1400  XR Chest 1 View  1 Time Imaging         01/18/22 1400    01/18/22 1400  ECG 12 Lead  Once         01/18/22 1400    01/18/22 1400  POC Glucose Once  Once         01/18/22 1400    01/18/22 1400  Insert peripheral IV  Once         01/18/22 1400    01/18/22 1400  [place] Draw  Once         01/18/22 1400    01/18/22 1400  CBC & Differential  Once         01/18/22 1400     01/18/22 1400  Comprehensive Metabolic Panel  Once         01/18/22 1400    01/18/22 1400  Troponin  Once         01/18/22 1400    01/18/22 1400  Magnesium  Once         01/18/22 1400    01/18/22 1400  Green Top (Gel)  PROCEDURE ONCE         01/18/22 1400    01/18/22 1400  Lavender Top  PROCEDURE ONCE         01/18/22 1400    01/18/22 1400  Gold Top - SST  PROCEDURE ONCE         01/18/22 1400    01/18/22 1400  Light Blue Top  PROCEDURE ONCE         01/18/22 1400    01/18/22 1400  CBC Auto Differential  PROCEDURE ONCE         01/18/22 1400    01/18/22 1359  Oxygen Therapy- Nasal Cannula; 2 LPM; Titrate for SPO2: 92%, Greater Than or Equal To  Continuous PRN,   Status:  Canceled       01/18/22 1400    01/18/22 1359  sodium chloride 0.9 % flush 10 mL  As Needed,   Status:  Discontinued         01/18/22 1400    Unscheduled  Central Line Care  As Needed       01/18/22 1552    Unscheduled  Up in Chair  As Needed       01/18/22 1815    --  aspirin 81 MG chewable tablet  Daily         01/18/22 1405    --  atorvastatin (LIPITOR) 40 MG tablet  Daily         01/18/22 1405    --  Canagliflozin (INVOKANA) 100 MG tablet tablet  Daily         01/18/22 1405    --  insulin aspart (novoLOG FLEXPEN) 100 UNIT/ML solution pen-injector sc pen  3 Times Daily With Meals         01/18/22 1405    --  metoprolol tartrate (LOPRESSOR) 25 MG tablet  Every 8 Hours         01/18/22 1405    --  sacubitril-valsartan (ENTRESTO) 24-26 MG tablet  2 Times Daily         01/18/22 1405    --  acetaminophen (TYLENOL) 325 MG tablet  Every 4 Hours PRN         01/18/22 1405    --  POLYETHYLENE GLYCOL 3350 PO  Daily         01/18/22 1405    Signed and Held  Weigh Patient Before & After Each Dialysis Treatment  As Needed         Signed and Held    --  calcium acetate (PHOS BINDER,) 667 MG capsule capsule  3 Times Daily         01/18/22 1643    --  epoetin saba (EPOGEN,PROCRIT) 55672 UNIT/ML injection  Every 7 Days         01/18/22 1643    --  traMADol (ULTRAM)  50 MG tablet  Daily PRN         01/18/22 1643    --  hydrOXYzine pamoate (VISTARIL) 25 MG capsule  Every 6 Hours PRN         01/18/22 1643    Signed and Held  Basic Metabolic Panel  Morning Draw,   Status:  Canceled         Signed and Held    Signed and Held  CBC (No Diff)  Morning Draw,   Status:  Canceled         Signed and Held                Respiratory Therapy (last 24 hours)     Respiratory Therapy Flowsheet     Row Name 01/18/22 1955 01/18/22 1928 01/18/22 1830 01/18/22 1827 01/18/22 1804       $ Oximetry Charges    $ O2 Pt/System Assessment -- yes -- -- --       Oxygen Therapy    SpO2 -- 94 % -- 95 % --    Pulse Oximetry Type -- Intermittent -- Intermittent --    Device (Oxygen Therapy) -- room air -- room air --    SF Ratio -- -- -- -- 228       Vital Signs    Temp 99.4 °F (37.4 °C) -- -- 99.5 °F (37.5 °C) --    Temp src Oral -- -- Oral --    Pulse -- 92 -- 94 --    Heart Rate Source -- Monitor -- Monitor --    Resp 16 16 -- 16 --    Resp Rate Source Visual Visual -- Visual --    BP -- -- -- 149/70 --    Noninvasive MAP (mmHg) -- -- -- 89 --    BP Location -- -- -- Left arm --    BP Method -- -- -- Automatic --    Patient Position -- -- -- Lying --       Assessment    Respiratory WDL -- -- WDL -- --    Skin Integrity -- -- pressure injury -- --    Row Name 01/18/22 1744 01/18/22 1645 01/18/22 1611 01/18/22 1545 01/18/22 1513       $ Oximetry Charges    $ O2 Pt/System Assessment -- -- yes -- --       Oxygen Therapy    SpO2 94 % 96 % 96 % 93 % --    Pulse Oximetry Type Continuous -- Continuous -- --    Device (Oxygen Therapy) nasal cannula -- room air -- --    Flow (L/min) 3 -- -- -- --       Vital Signs    Pulse 90 93 91 96 --    Heart Rate Source Monitor -- Monitor -- --    Resp 20 -- 22 -- --    Resp Rate Source Monitor -- Monitor -- --    /63 145/67 -- -- 134/61    Noninvasive MAP (mmHg) -- 90 -- -- --    BP Location Left arm -- -- -- Left arm    BP Method Automatic -- -- -- Automatic    Patient  Position Lying -- -- -- Lying       Assessment    Respiratory WDL -- -- WDL except; breath sounds -- --       Breath Sounds    Breath Sounds -- -- All Fields -- --    All Lung Fields Breath Sounds -- -- Anterior:; rhonchi -- --       Breath Sounds Post-Respiratory Treatment    Breath Sounds Posttreatment All Fields -- -- All Fields -- --    Breath Sounds Posttreatment All Fields -- -- no change -- --       Aerosol Therapy (SVN)    $ Mini Neb Initial -- -- yes -- --    Respiratory Treatment Status (SVN) -- -- given -- --    Treatment Route (SVN) -- -- mouthpiece -- --    Patient Position (SVN) -- -- semi-Ivory's -- --    Posttreatment Assessment (SVN) -- -- breath sounds unchanged -- --    Signs of Intolerance (SVN) -- -- none -- --    Row Name 22 14:44:23 22 14:20:49                Oxygen Therapy    SpO2 95 % --       Device (Oxygen Therapy) room air --                Vital Signs    Temp 99.1 °F (37.3 °C) --       Temp src Oral --       Pulse 92 --       Heart Rate Source Monitor --       Resp 25 --       Resp Rate Source Monitor --                Assessment    Rhythm/Pattern, Respiratory -- shortness of breath                Breath Sounds    Breath Sounds -- All Fields       All Lung Fields Breath Sounds -- Anterior:; rhonchi                 Physician Progress Notes (last 24 hours)  Notes from 22 through 22   No notes of this type exist for this encounter.            Consult Notes (last 24 hours)      Anusha Villalba APRN at 22 1547      Consult Orders    1. Inpatient Nephrology Consult [[id number]] ordered by Vilma Elizondo MD at 22 1651                 [facility]   Consult Note    Patient Name: Shakira Hopson  : 1950  MRN: [id number]  Primary Care Physician:  Gopal Umana MD  Referring Physician: No ref. provider found  Date of admission: 2022    Subjective   Subjective     Reason for Consult/ Chief Complaint:  Hyperkalemia    HPI:  Shakira Hopson is a 71 y.o. female with ESRD on hemodialysis.  She has been ESRD greater than 2 years based on reviewed records.  She is our established patient at [facility] dialysis [facility] and was sent for acute rehab at [facility].  Per [facility] staff her last hemodialysis treatment was Friday 1/14/2021 prior to coming to rehab.  Dialysis was ordered and scheduled for yesterday, she did not get dialysis and she was sent to the ER for increased confusion and abdominal pain. She was found to have potassium of 7.1 in ER. She received emergent hyperkalemic treatment.  X-ray resulted findings consistent with multifocal pneumonia, right greater than left.  She is confused in the emergency room and when asked questions she is unable to answer.  She is intermittently moaning.    Review of Systems  Unable to obtain due to confusion and mental status.    Personal History     Past Medical History:   Diagnosis Date   • Anemia    • CHF (congestive heart failure) ([facility])    • Coronary artery disease    • Dementia ([facility])    • Diabetes mellitus (HCC)    • End stage renal disease (HCC)    • Hemodialysis patient ([facility])    • Hyperlipidemia    • Hypertension    • Myocardial infarction (HCC)    • Pneumonia        History reviewed. No pertinent surgical history.    Family History: family history is not on file. Otherwise pertinent FHx was reviewed and not pertinent to current issue.    Social History:      Home Medications:  Canagliflozin, Polyethylene Glycol 3350, acetaminophen, aspirin, atorvastatin, calcium acetate, epoetin saba, hydrOXYzine pamoate, insulin aspart, metoprolol tartrate, sacubitril-valsartan, and traMADol    Allergies:  Allergies   Allergen Reactions   • Codeine Unknown - Low Severity   • Pork-Derived Products Unknown - Low Severity       Objective    Objective     Vitals:   Temp:  [99.1 °F (37.3 °C)] 99.1 °F (37.3 °C)  Heart Rate:  [90-96] 90  Resp:  [20-25] 20  BP:  (128-145)/(61-67) 128/63  Flow (L/min):  [3] 3    Physical Exam:             Constitutional:       Awake, disoriented, not answering questions.    Eyes:                       PERRLA, sclerae anicteric, no conjunctival injection   HEENT:                   Moist mucous membranes, no nasal or eye discharge, no throat congestion   Neck:                      Supple, no thyromegaly, no lymphadenopathy, trachea midline, no elevated JVD   Respiratory:           Clear to auscultation bilaterally, nonlabored respirations    Cardiovascular:     RRR, no murmurs, rubs, or gallops, palpable pedal pulses bilaterally, No bilateral ankle edema   Gastrointestinal:   Positive bowel sounds, soft, nontender, non-distended, no organomegaly   Musculoskeletal:  No clubbing or cyanosis to extremities, muscle wasting, joint swelling, muscle weakness   Neurologic:            Confused, strength symmetric in all extremities, Cranial Nerves grossly intact to confrontation, speech clear   Skin:                      No rashes, bruising, skin ulcers, petechiae or ecchymosis    Result Review    Result Review:  I have personally reviewed the results from the time of this admission to 1/18/2022 18:21 EST and agree with these findings:  [x]  Laboratory  [x]  Microbiology  [x]  Radiology  [x]  EKG/Telemetry   [x]  Cardiology/Vascular   []  Pathology  []  Old records  []  Other:      Assessment/Plan   Assessment / Plan     Active Hospital Problems:  Active Hospital Problems    Diagnosis    • ESRD (end stage renal disease) on dialysis (HCC)    • Hyperkalemia    • Pneumonia        Plan:   Stat hemodialysis  Continue IV antibiotics.    Labs tomorrow.     Electronically signed by Juana Sauceda MD, 01/18/22, 3:47 PM EST.         Electronically signed by Anusha Villalba APRN at 01/18/22 0776       Respiratory Therapy Notes (last 24 hours)  Notes from 01/17/22 2157 through 01/18/22 2157   No notes exist for this encounter.

## 2022-01-19 NOTE — PROGRESS NOTES
" [facility]   Hospitalist Progress Note  Date: 1/19/2022  Patient Name: Shakira Hopson    Subjective   Subjective     Chief Complaint:   Hyperkalemia    Summary:   Shakira Hopson is a 71 y.o. female who is a chronic dialysis patient in [place] was at acute rehab, difficulty with dialysis staff, detected to have an elevated temperature and transferred to the emergency department.  Overdue for dialysis.  No other current history available, I am unable to find the paper chart and the patient is not oriented.  Per the ED physician the patient was recently mated to a facility for pneumonia but unclear what facility.  Unable to obtain further history.  COVID-19 swab positive    Interval Followup: Hemodialysis overnight, patient is feeling much better today but is frustrated unable to work or television.  Has removed several IVs today.  Remains on room air is asymptomatic from her COVID-19.    Review of Systems   No nausea no vomiting no diarrhea no chest pain    Objective   Objective     Vitals:   Temp:  [97.6 °F (36.4 °C)-99.7 °F (37.6 °C)] 97.6 °F (36.4 °C)  Heart Rate:  [] 85  Resp:  [16] 16  BP: (118-149)/(41-70) 128/47  Physical Exam   Gen: NAD, WDWN female lying in bed comfortably  HEENT: NCAT, mmm  Resp: CTAB no wrr, no dyspnea  CV: RRR no mrg, no LE edema, right upper extremity fistula with palpable thrill  GI: Abdomen soft NT, ND +bs  Psych: Alert, oriented to self but not place or time, believes she is at \"[facility]\"      Result Review    Result Review:  I have personally reviewed the results from the time of this admission to 1/19/2022 18:01 EST and agree with these findings:  [x]?  Laboratory  Potassium 5  Hemoglobin 9.5  [x]?  Microbiology  COVID positive  [x]?  Radiology  Chest x-ray: Multifocal pneumonia right greater than left  [x]?  EKG/Telemetry sinus rhythm  []?  Cardiology/Vascular   []?  Pathology  []?  Old records  []?  Other:              Assessment/Plan      Assessment / Plan "      Assessment:   Life-threatening hyperkalemia  ESRD overdue for dialysis  COVID 19  Dementia with confusion  Diabetes  CHF     Plan:     Discontinue antibiotics, x-ray findings consistent with COVID-19  Asymptomatic from COVID-19, does not warrant steroids, due to renal failure is not a candidate for remdesivir  Need to obtain records from [facility] to determine which hospital she came from--unable to find records that were scanned in yesterday, will need to request again  Continue home aspirin, statin, metoprolol, Entresto  Continue home Phos binders  Sliding-scale insulin for now, holding Invokana  DC telemetry  Ready for transfer back to rehab when bed is available     DVT ppx: Heparin  Diet: Diabetic renal  Discussed with bedside RN

## 2022-01-19 NOTE — CONSULTS
"Nutrition Services    Patient Name: Shakira Hopson  YOB: 1950  MRN: [id number]  Admission date: 1/18/2022      CLINICAL NUTRITION ASSESSMENT      Reason for Assessment  BMI     H&P:    Past Medical History:   Diagnosis Date   • Anemia    • CHF (congestive heart failure) ([facility])    • Coronary artery disease    • Dementia (HCC)    • Diabetes mellitus (HCC)    • End stage renal disease (HCC)    • Hemodialysis patient ([facility])    • Hyperlipidemia    • Hypertension    • Myocardial infarction (HCC)    • Pneumonia         Current Problems:   Active Hospital Problems    Diagnosis    • Pneumonia due to COVID-19 virus    • ESRD (end stage renal disease) on dialysis ([facility])    • Hyperkalemia    • Pneumonia         Nutrition/Diet History         Narrative     Patient with hx of ESRD on dialysis, overdue for dialysis, admitted with confusion, noted potassium critically high 7.1, glucose this morning is 56. Patient confused and currently in dialysis. Unable to speak with patient. Noted no previous weight history on file. No PO intake on file at this time. Will add Novasource renal oral supplements and encourage PO intake.        Anthropometrics        Current Height, Weight Height: 167.6 cm (66\")  Weight: 48.8 kg (107 lb 9.4 oz)   Current BMI Body mass index is 17.36 kg/m².       Weight Hx  Wt Readings from Last 30 Encounters:   01/18/22 1827 48.8 kg (107 lb 9.4 oz)   01/18/22 1513 48.6 kg (107 lb 2.3 oz)            Wt Change Observation      Estimated/Assessed Needs       Energy Requirements    EST Needs (kcal/day) 9304-9087 kcal/d       Protein Requirements    EST Daily Needs (g/day) 50-73 gm/d       Fluid Requirements     Estimated Needs (mL/day) 2602-1377 ml/d     Labs/Medications         Pertinent Labs Reviewed.   Results from last 7 days   Lab Units 01/18/22  1443 01/17/22  0430 01/15/22  0500   SODIUM mmol/L 134* 137 135*   POTASSIUM mmol/L 7.1* 6.0* 4.6   CHLORIDE mmol/L 97* 97* 95*   CO2 mmol/L 21.9* 25.4 27.8 "   BUN mg/dL 72* 51* 23   CREATININE mg/dL 8.36* 7.03* 3.48*   CALCIUM mg/dL 8.7 8.6 9.1   BILIRUBIN mg/dL 0.2 0.3 0.2   ALK PHOS U/L 81 90 69   ALT (SGPT) U/L 18 26 10   AST (SGOT) U/L 28 38* 17   GLUCOSE mg/dL 101* 95 75     Results from last 7 days   Lab Units 01/18/22  1443 01/17/22  0430 01/17/22  0430   MAGNESIUM mg/dL 1.9  --  1.8   PHOSPHORUS mg/dL  --   --  6.3*   HEMOGLOBIN g/dL 9.5*   < > 9.7*   HEMATOCRIT % 31.0*   < > 32.9*    < > = values in this interval not displayed.     COVID19   Date Value Ref Range Status   01/18/2022 Detected (C) Not Detected - Ref. Range Final     No results found for: HGBA1C      Pertinent Medications Reviewed.     Current Nutrition Orders & Evaluation of Intake       Oral Nutrition     Current PO Diet Diet Regular; Cardiac, Consistent Carbohydrate, Renal   Supplement No active supplement orders       Nutrition Diagnosis         Nutrition Dx Problem 1 Inadequate nutrient intake related to inadequate oral Intake as evidenced by BMI 17.36       Nutrition Intervention         [facility] Renal TID       Monitor/Evaluation        Monitor Per protocol, PO intake, Supplement intake, Pertinent labs, Weight       Nutrition Discharge Plan         To be determined       Electronically signed by:  Jolanta Arora RD  01/19/22 09:17 EST

## 2022-01-19 NOTE — THERAPY EVALUATION
Acute Care - Physical Therapy Initial Evaluation  AMBROSIO Khan     Patient Name: Shakira Hopson  : 1950  MRN: [id number]  Today's Date: 2022     Admit date: 2022     Referring Physician: Vilma Elizondo MD     Surgery Date:* No surgery found *               Visit Dx:     ICD-10-CM ICD-9-CM   1. Hyperkalemia  E87.5 276.7   2. Pneumonia of both lungs due to infectious organism, unspecified part of lung  J18.9 483.8   3. Chronic kidney disease with end stage renal failure on dialysis (HCC)  N18.6 585.6    Z99.2 V45.11   4. Decreased activities of daily living (ADL)  Z78.9 V49.89   5. Difficulty in walking  R26.2 719.7     Patient Active Problem List   Diagnosis   • ESRD (end stage renal disease) on dialysis ([facility])   • Hyperkalemia   • Pneumonia   • Pneumonia due to COVID-19 virus     Past Medical History:   Diagnosis Date   • Anemia    • CHF (congestive heart failure) ([facility])    • Coronary artery disease    • Dementia ([facility])    • Diabetes mellitus ([facility])    • End stage renal disease ([facility])    • Hemodialysis patient ([facility])    • Hyperlipidemia    • Hypertension    • Myocardial infarction ([facility])    • Pneumonia      History reviewed. No pertinent surgical history.  PT Assessment (last 12 hours)     PT Evaluation and Treatment     Row Name 22 1409          Physical Therapy Time and Intention    Subjective Information no complaints  -MATTHEW     Document Type evaluation  -MATTHEW     Mode of Treatment individual therapy; physical therapy  -MATTHEW     Patient Effort good  -MATTHEW     Row Name 22 1409          General Information    Patient Observations alert; cooperative; agree to therapy  -MATTHEW     Prior Level of Function --  unknown.  Per chart pt was at and acute rehab facility  -MATTHEW     Existing Precautions/Restrictions fall  -MATTHEW     Barriers to Rehab none identified  -MATTHEW     Row Name 22 1409          Living Environment    Current Living Arrangements extended care facility  -MATTHEW     Row Name 22 1408           Range of Motion (ROM)    Range of Motion ROM is WFL  -MATTHEW     Row Name 01/19/22 1409          Strength (Manual Muscle Testing)    Strength (Manual Muscle Testing) bilateral lower extremities  pt unable to follow commands for formal MMT  -MATTHEW     Row Name 01/19/22 1409          Bed Mobility    Bed Mobility bed mobility (all) activities; supine-sit  -MATTHEW     All Activities, [place] (Bed Mobility) maximum assist (25% patient effort)  -MATTHEW     Rolling Left [place] (Bed Mobility) maximum assist (25% patient effort)  -MATTHEW     Row Name 01/19/22 1409          Safety Issues, Functional Mobility    Safety Issues Affecting Function (Mobility) ability to follow commands  -MATTHEW     Impairments Affecting Function (Mobility) balance; cognition; pain; motor control; strength; shortness of breath  -MATTHEW     Row Name 01/19/22 1409          Balance    Balance Assessment sitting static balance  -MATTHEW     Static Sitting Balance severe impairment  -MATTHEW     Row Name             Wound 01/18/22 1815 Other (See comments) coccyx Pressure Injury    Wound - Properties Group Placement Date: 01/18/22  -SB Placement Time: 1815  -SB Present on Hospital Admission: Y  -SB Side: Other (See comments)  -SB, coccyx  Location: coccyx  -SB Primary Wound Type: Pressure inj  -SB Stage, Pressure Injury : Stage 2  -SB     Retired Wound - Properties Group Date first assessed: 01/18/22  -SB Time first assessed: 1815  -SB Present on Hospital Admission: Y  -SB Side: Other (See comments)  -SB, coccyx  Location: coccyx  -SB Primary Wound Type: Pressure inj  -SB     Row Name 01/19/22 1418          Plan of Care Review    Plan of Care Reviewed With patient  -MATTHEW     Outcome Summary Patient presents with decreased strength, transfers and ambulation.  Skilled physical therapy services will be required to address these mobility deficits.  -MATTHEW     Row Name 01/19/22 1418          Physical Therapy Goals    Transfer Goal Selection (PT) transfer, PT goal 1  -MATTHEW     Gait Training  Goal Selection (PT) gait training, PT goal 1  -MATTHEW     Row Name 01/19/22 1418          Transfer Goal 1 (PT)    Activity/Assistive Device (Transfer Goal 1, PT) transfers, all  -MATTHEW     [place] Level/Cues Needed (Transfer Goal 1, PT) minimum assist (75% or more patient effort)  -MATTHEW     Time Frame (Transfer Goal 1, PT) long term goal (LTG); 10 days  -MATTHEW     Row Name 01/19/22 1418          Gait Training Goal 1 (PT)    Activity/Assistive Device (Gait Training Goal 1, PT) gait (walking locomotion); assistive device use; walker, rolling  -MATTHEW     [place] Level (Gait Training Goal 1, PT) minimum assist (75% or more patient effort)  -MATTHEW     Distance (Gait Training Goal 1, PT) 50  -MATTHEW     Time Frame (Gait Training Goal 1, PT) long term goal (LTG); 10 days  -MATTHEW     Row Name 01/19/22 1418          Therapy Assessment/Plan (PT)    Patient/Family Therapy Goals Statement (PT) none stated  -MATTHEW     Rehab Potential (PT) fair, will monitor progress closely  -MATTHEW     Criteria for Skilled Interventions Met (PT) skilled treatment is necessary  -MATTHEW     Predicted Duration of Therapy Intervention (PT) 10 days  -MATTHEW     Problem List (PT) problems related to; balance; strength; mobility  -MATTHEW     Activity Limitations Related to Problem List (PT) unable to ambulate safely; unable to transfer safely  -MATTHEW     Row Name 01/19/22 1418          PT Evaluation Complexity    History, PT Evaluation Complexity no personal factors and/or comorbidities  -MATTHEW     Examination of Body Systems (PT Eval Complexity) total of 4 or more elements  -MATTHEW     Clinical Presentation (PT Evaluation Complexity) stable  -MATTHEW     Clinical Decision Making (PT Evaluation Complexity) low complexity  -MATTHEW     Overall Complexity (PT Evaluation Complexity) low complexity  -MATTHEW           User Key  (r) = Recorded By, (t) = Taken By, (c) = Cosigned By    Initials Name Provider Type    MATTHEW Ok Stroud, PT Physical Therapist    Palak Maier RNA Registered Nurse                 Physical Therapy Education                 Title: PT OT SLP Therapies (In Progress)     Topic: Physical Therapy (In Progress)     Point: Mobility training (In Progress)     Learning Progress Summary           Patient Acceptance, E, NR by MATTHEW at 1/19/2022 1421                   Point: Precautions (In Progress)     Learning Progress Summary           Patient Acceptance, E, NR by MATTHEW at 1/19/2022 1421                               User Key     Initials Effective Dates Name Provider Type Discipline    MATTHEW 06/03/21 -  Ok Stroud PT Physical Therapist PT              PT Recommendation and Plan  Anticipated Discharge Disposition (PT): extended care facility, sub acute care setting  Planned Therapy Interventions (PT): balance training, bed mobility training, gait training, home exercise program, ROM (range of motion), stair training, strengthening, transfer training  Therapy Frequency (PT): daily  Plan of Care Reviewed With: patient  Outcome Summary: Patient presents with decreased strength, transfers and ambulation.  Skilled physical therapy services will be required to address these mobility deficits.   Outcome Measures     Row Name 01/19/22 1421             How much help from another person do you currently need...    Turning from your back to your side while in flat bed without using bedrails? 2  -MATTHEW      Moving from lying on back to sitting on the side of a flat bed without bedrails? 2  -MATTHEW      Moving to and from a bed to a chair (including a wheelchair)? 1  -MATTHEW      Standing up from a chair using your arms (e.g., wheelchair, bedside chair)? 1  -MATTHEW      Climbing 3-5 steps with a railing? 1  -MATTHEW      To walk in hospital room? 1  -MATTHEW      AM-PAC 6 Clicks Score (PT) 8  -MATTHEW              Functional Assessment    Outcome Measure Options AM-PAC 6 Clicks Basic Mobility (PT)  -MATTHEW            User Key  (r) = Recorded By, (t) = Taken By, (c) = Cosigned By    Initials Name Provider Type    Ok Penny, PT  Physical Therapist                 Time Calculation:    PT Charges     Row Name 01/19/22 1414             Time Calculation    PT Received On 01/19/22  -MATTHEW      PT Goal Re-Cert Due Date 01/28/22  -MATTHEW              Untimed Charges    PT Eval/Re-eval Minutes 19  -MATTHEW              Total Minutes    Untimed Charges Total Minutes 19  -MATTHEW       Total Minutes 19  -MATTHEW            User Key  (r) = Recorded By, (t) = Taken By, (c) = Cosigned By    Initials Name Provider Type    Ok Penny, PT Physical Therapist              Therapy Charges for Today     Code Description Service Date Service Provider Modifiers Qty    [id number] HC PT EVAL LOW COMPLEXITY 2 1/19/2022 Ok Stroud, PT GP 1          PT G-Codes  Outcome Measure Options: AM-PAC 6 Clicks Basic Mobility (PT)  AM-PAC 6 Clicks Score (PT): 8  AM-PAC 6 Clicks Score (OT): 12    Ok Stroud PT  1/19/2022

## 2022-01-19 NOTE — THERAPY EVALUATION
Patient Name: Shakira Hopson  : 1950    MRN: [id number]                              Today's Date: 2022       Admit Date: 2022    Visit Dx:     ICD-10-CM ICD-9-CM   1. Hyperkalemia  E87.5 276.7   2. Pneumonia of both lungs due to infectious organism, unspecified part of lung  J18.9 483.8   3. Chronic kidney disease with end stage renal failure on dialysis (HCC)  N18.6 585.6    Z99.2 V45.11   4. Decreased activities of daily living (ADL)  Z78.9 V49.89   5. Difficulty in walking  R26.2 719.7     Patient Active Problem List   Diagnosis   • ESRD (end stage renal disease) on dialysis ([facility])   • Hyperkalemia   • Pneumonia   • Pneumonia due to COVID-19 virus     Past Medical History:   Diagnosis Date   • Anemia    • CHF (congestive heart failure) ([facility])    • Coronary artery disease    • Dementia ([facility])    • Diabetes mellitus ([facility])    • End stage renal disease ([facility])    • Hemodialysis patient ([facility])    • Hyperlipidemia    • Hypertension    • Myocardial infarction ([facility])    • Pneumonia      History reviewed. No pertinent surgical history.   General Information     Row Name 22 1356          OT Time and Intention    Document Type evaluation  -LF     Mode of Treatment individual therapy; occupational therapy  -     Row Name 22 5873          General Information    Patient Profile Reviewed yes  -     Prior Level of Function --  Patient reports being independent with ADLs and ambulating with a RW. She appears to be a poor historian with hx of dementia per EMR, no family present to verify subjective information.  -     Existing Precautions/Restrictions fall  -     Barriers to Rehab none identified  -     Row Name 22 2675          Occupational Profile    Reason for Services/Referral (Occupational Profile) Occupational therapy consulted due to recent decline in ADLs/functional transfers. No previous occupational therapy services for current condition.  -     Row Name 22 5163           "Living Environment    Lives With facility resident  Patient reports living at a facility but is unable to verify where  -     Row Name 01/19/22 1356          Cognition    Orientation Status (Cognition) oriented to; person  Difficulty following commands  -     Row Name 01/19/22 1356          Safety Issues, Functional Mobility    Safety Issues Affecting Function (Mobility) ability to follow commands; awareness of need for assistance; insight into deficits/self-awareness; judgment; problem-solving; sequencing abilities  -     Impairments Affecting Function (Mobility) balance; cognition; endurance/activity tolerance; strength  -     Comment, Safety Issues/Impairments (Mobility) Therapeutic exercises to address endurance.  -           User Key  (r) = Recorded By, (t) = Taken By, (c) = Cosigned By    Initials Name Provider Type     Shante Ram OT Occupational Therapist                 Mobility/ADL's     Row Name 01/19/22 1400          Bed Mobility    Bed Mobility rolling left; rolling right  -     Rolling Left [place] (Bed Mobility) maximum assist (25% patient effort)  -     Rolling Right [place] (Bed Mobility) maximum assist (25% patient effort)  -     Bed Mobility, Safety Issues cognitive deficits limit understanding  -     Assistive Device (Bed Mobility) draw sheet  -     Comment (Bed Mobility) Patient required max assist rolling left to right in bed, yelling \"no, no, no\" repeatedly throughout bed mobility. Further transfers deferred due to decreased cognition/safety and difficulty following commands.  -     Row Name 01/19/22 1400          Activities of Daily Living    BADL Assessment/Intervention bathing; upper body dressing; lower body dressing; grooming; feeding; toileting  -     Row Name 01/19/22 1400          Bathing Assessment/Intervention    [place] Level (Bathing) bathing skills; upper body; lower body; maximum assist (25% patient effort); dependent (less than 25% " patient effort)  -[facility] Name 01/19/22 1400          Upper Body Dressing Assessment/Training    [place] Level (Upper Body Dressing) upper body dressing skills; maximum assist (25% patient effort); dependent (less than 25% patient effort)  -[facility] Name 01/19/22 1400          Lower Body Dressing Assessment/Training    [place] Level (Lower Body Dressing) lower body dressing skills; maximum assist (25% patient effort); dependent (less than 25% patient effort)  -[facility] Name 01/19/22 1400          Grooming Assessment/Training    [place] Level (Grooming) grooming skills; maximum assist (25% patient effort); dependent (less than 25% patient effort)  -[facility] Name 01/19/22 1400          Self-Feeding Assessment/Training    [place] Level (Feeding) feeding skills; maximum assist (25% patient effort)  -[facility] Name 01/19/22 1400          Toileting Assessment/Training    [place] Level (Toileting) toileting skills; maximum assist (25% patient effort); dependent (less than 25% patient effort)  -     Comment (Toileting) Incontinent per EMR/nursing.  -           User Key  (r) = Recorded By, (t) = Taken By, (c) = Cosigned By    Initials Name Provider Type     Shante Ram OT Occupational Therapist               Obj/Interventions     [facility] Name 01/19/22 1403          Sensory Assessment (Somatosensory)    Sensory Assessment (Somatosensory) UE sensation intact  Difficult to formally assess but responds to light touch on bilateral upper extremities.  -[facility] Name 01/19/22 1403          Vision Assessment/Intervention    Visual Impairment/Limitations unable/difficult to assess  Patient required max cues to open her eyes throughout evaluation and made minimal eye contact with therapist.  -[facility] Name 01/19/22 1403          Range of Motion Comprehensive    General Range of Motion bilateral upper extremity ROM WFL  -     Comment, General Range of Motion Full AAROM of bilateral upper  extremities  -[facility] Name 01/19/22 1403          Strength Comprehensive (MMT)    Comment, General Manual Muscle Testing (MMT) Assessment Patient uncompliant for proximal upper extremity MMT and had difficulty following commands, 4/5 bilateral .  -[facility] Name 01/19/22 1403          Motor Skills    Motor Skills coordination; functional endurance  -LF     Coordination other (see comments)  Unable to formally assess due to difficulty following commands  -     Functional Endurance Poor  -[facility] Name 01/19/22 1403          Balance    Comment, Balance Not tested  -           User Key  (r) = Recorded By, (t) = Taken By, (c) = Cosigned By    Initials Name Provider Type     Shante Ram, OT Occupational Therapist               Goals/Plan     [facility] Name 01/19/22 1411          Bed Mobility Goal 1 (OT)    Activity/Assistive Device (Bed Mobility Goal 1, OT) bed mobility activities, all  -LF     [place] Level/Cues Needed (Bed Mobility Goal 1, OT) supervision required  -LF     Time Frame (Bed Mobility Goal 1, OT) long term goal (LTG); 10 days  -[facility] Name 01/19/22 1411          Transfer Goal 1 (OT)    Activity/Assistive Device (Transfer Goal 1, OT) transfers, all; walker, rolling  -LF     [place] Level/Cues Needed (Transfer Goal 1, OT) contact guard assist; minimum assist (75% or more patient effort)  -LF     Time Frame (Transfer Goal 1, OT) long term goal (LTG); 10 days  -[facility] Name 01/19/22 1411          Bathing Goal 1 (OT)    Activity/Device (Bathing Goal 1, OT) bathing skills, all  -LF     [place] Level/Cues Needed (Bathing Goal 1, OT) minimum assist (75% or more patient effort)  -LF     Time Frame (Bathing Goal 1, OT) long term goal (LTG); 10 days  -[facility] Name 01/19/22 1411          Dressing Goal 1 (OT)    Activity/Device (Dressing Goal 1, OT) dressing skills, all; lower body dressing  -LF     [place]/Cues Needed (Dressing Goal 1, OT) minimum assist (75% or more  patient effort)  -     Time Frame (Dressing Goal 1, OT) long term goal (LTG); 10 days  -     Row Name 01/19/22 1411          Toileting Goal 1 (OT)    Activity/Device (Toileting Goal 1, OT) toileting skills, all  -LF     [place] Level/Cues Needed (Toileting Goal 1, OT) minimum assist (75% or more patient effort)  -LF     Time Frame (Toileting Goal 1, OT) long term goal (LTG); 10 days  -[facility] Name 01/19/22 1411          Grooming Goal 1 (OT)    Activity/Device (Grooming Goal 1, OT) grooming skills, all  -LF     [place] (Grooming Goal 1, OT) standby assist  -LF     Time Frame (Grooming Goal 1, OT) long term goal (LTG); 10 days  -[facility] Name 01/19/22 1411          Strength Goal 1 (OT)    Strength Goal 1 (OT) Patient will demonstrate 4-/5 bilateral upper extremity strength to support ADLs/functional transfers.  -     Time Frame (Strength Goal 1, OT) long term goal (LTG); 10 days  -[facility] Name 01/19/22 1411          Therapy Assessment/Plan (OT)    Planned Therapy Interventions (OT) activity tolerance training; patient/caregiver education/training; BADL retraining; functional balance retraining; occupation/activity based interventions; transfer/mobility retraining; strengthening exercise; ROM/therapeutic exercise  -           User Key  (r) = Recorded By, (t) = Taken By, (c) = Cosigned By    Initials Name Provider Type     Shante Ram OT Occupational Therapist               Clinical Impression     Row Name 01/19/22 1409          Pain Assessment    Additional Documentation Pain Scale: FACES Pre/Post-Treatment (Group)  -LF     Row Name 01/19/22 1409          Pain Scale: FACES Pre/Post-Treatment    Pain: FACES Scale, Pretreatment 2-->hurts little bit  -     Posttreatment Pain Rating 4-->hurts little more  -     Pain Location - Orientation generalized  -LF     Row Name 01/19/22 1409          Plan of Care Review    Plan of Care Reviewed With patient  -     Progress no change  -      Outcome Summary Patient presents with limitations in self-care, functional transfers, balance, and endurance. She would benefit from continued skilled occupational therapy services to maximize ADL performance and return home safely and independently.  -     Row Name 01/19/22 1409          Therapy Assessment/Plan (OT)    Patient/Family Therapy Goal Statement (OT) None reported  -     Rehab Potential (OT) good, to achieve stated therapy goals  -     Criteria for Skilled Therapeutic Interventions Met (OT) yes; meets criteria; skilled treatment is necessary  -     Therapy Frequency (OT) 5 times/wk  -     Row Name 01/19/22 1409          Therapy Plan Review/Discharge Plan (OT)    Anticipated Discharge Disposition (OT) sub acute care setting; extended care facility  Return to extended care facility when medically able to do so  -     Row Name 01/19/22 1409          Vital Signs    O2 Delivery Pre Treatment room air  -LF     O2 Delivery Intra Treatment room air  -LF     O2 Delivery Post Treatment room air  -LF           User Key  (r) = Recorded By, (t) = Taken By, (c) = Cosigned By    Initials Name Provider Type     Shante Ram, OT Occupational Therapist               Outcome Measures     Row Name 01/19/22 1415          How much help from another is currently needed...    Putting on and taking off regular lower body clothing? 2  -LF     Bathing (including washing, rinsing, and drying) 2  -LF     Toileting (which includes using toilet bed pan or urinal) 1  -LF     Putting on and taking off regular upper body clothing 2  -LF     Taking care of personal grooming (such as brushing teeth) 2  -LF     Eating meals 3  -LF     AM-PAC 6 Clicks Score (OT) 12  -LF     Row Name 01/19/22 1200 01/19/22 0230       How much help from another person do you currently need...    Turning from your back to your side while in flat bed without using bedrails? 1  -ALEXANDER 1  -RG    Moving from lying on back to sitting on the side of a  flat bed without bedrails? 1  -ALEXANDER 1  -RG    Moving to and from a bed to a chair (including a wheelchair)? 1  -ALEXANDER 1  -RG    Standing up from a chair using your arms (e.g., wheelchair, bedside chair)? 1  -ALEXANDER 1  -RG    Climbing 3-5 steps with a railing? 1  -ALEXANDER 1  -RG    To walk in hospital room? 1  -ALEXANDER 1  -RG    AM-PAC 6 Clicks Score (PT) 6  -ALEXANDER 6  -RG    Row Name 01/19/22 1415          Functional Assessment    Outcome Measure Options AM-PAC 6 Clicks Daily Activity (OT); Optimal Instrument  -LF     Row Name 01/19/22 1415          Optimal Instrument    Optimal Instrument Optimal - 3  -LF     Bending/Stooping 5  -LF     Standing 5  -LF     Reaching 2  -LF     From the list, choose the 3 activities you would most like to be able to do without any difficulty Bending/stooping; Standing; Reaching  -LF     Total Score Optimal - 3 12  -LF           User Key  (r) = Recorded By, (t) = Taken By, (c) = Cosigned By    Initials Name Provider Type    Anusha Boyle, RN Registered Nurse     Shante Ram, YESSY Occupational Therapist    Cesar Morrow RNA Registered Nurse                Occupational Therapy Education                 Title: PT OT SLP Therapies (In Progress)     Topic: Occupational Therapy (In Progress)     Point: ADL training (In Progress)     Description:   Instruct learner(s) on proper safety adaptation and remediation techniques during self care or transfers.   Instruct in proper use of assistive devices.              Learning Progress Summary           Patient Acceptance, E,TB, NL by  at 1/19/2022 1416                   Point: Home exercise program (Not Started)     Description:   Instruct learner(s) on appropriate technique for monitoring, assisting and/or progressing therapeutic exercises/activities.              Learner Progress:  Not documented in this visit.          Point: Precautions (In Progress)     Description:   Instruct learner(s) on prescribed precautions during self-care and functional  transfers.              Learning Progress Summary           Patient Acceptance, E,TB, NL by  at 1/19/2022 1416                   Point: Body mechanics (In Progress)     Description:   Instruct learner(s) on proper positioning and spine alignment during self-care, functional mobility activities and/or exercises.              Learning Progress Summary           Patient Acceptance, E,TB, NL by  at 1/19/2022 1416                               User Key     Initials Effective Dates Name Provider Type Discipline     06/16/21 -  Shante Ram OT Occupational Therapist OT              OT Recommendation and Plan  Planned Therapy Interventions (OT): activity tolerance training, patient/caregiver education/training, BADL retraining, functional balance retraining, occupation/activity based interventions, transfer/mobility retraining, strengthening exercise, ROM/therapeutic exercise  Therapy Frequency (OT): 5 times/wk  Plan of Care Review  Plan of Care Reviewed With: patient  Progress: no change  Outcome Summary: Patient presents with limitations in self-care, functional transfers, balance, and endurance. She would benefit from continued skilled occupational therapy services to maximize ADL performance and return home safely and independently.     Time Calculation:    Time Calculation- OT     Row Name 01/19/22 1417             Time Calculation- OT    OT Received On 01/19/22  -      OT Goal Re-Cert Due Date 01/28/22  -              Untimed Charges    OT Eval/Re-eval Minutes 34  -LF              Total Minutes    Untimed Charges Total Minutes 34  -LF       Total Minutes 34  -LF            User Key  (r) = Recorded By, (t) = Taken By, (c) = Cosigned By    Initials Name Provider Type     Shante Ram OT Occupational Therapist              Therapy Charges for Today     Code Description Service Date Service Provider Modifiers Qty    [id number]  OT EVAL LOW COMPLEXITY 3 1/19/2022 Shante Ram OT GO 1                Shante Ram, OT  1/19/2022

## 2022-01-19 NOTE — PLAN OF CARE
Goal Outcome Evaluation:  Plan of Care Reviewed With: patient        Progress: no change  Outcome Summary: Patient presents with limitations in self-care, functional transfers, balance, and endurance. She would benefit from continued skilled occupational therapy services to maximize ADL performance and return home safely and independently.

## 2022-01-20 VITALS
TEMPERATURE: 98 F | RESPIRATION RATE: 18 BRPM | BODY MASS INDEX: 17.29 KG/M2 | WEIGHT: 107.58 LBS | HEIGHT: 66 IN | SYSTOLIC BLOOD PRESSURE: 128 MMHG | HEART RATE: 84 BPM | OXYGEN SATURATION: 98 % | DIASTOLIC BLOOD PRESSURE: 52 MMHG

## 2022-01-20 PROBLEM — D89.831 CYTOKINE RELEASE SYNDROME, GRADE 1: Status: ACTIVE | Noted: 2022-01-20

## 2022-01-20 LAB
GLUCOSE BLDC GLUCOMTR-MCNC: 147 MG/DL (ref 70–99)
GLUCOSE BLDC GLUCOMTR-MCNC: 90 MG/DL (ref 70–99)
HBV CORE IGM SERPL QL IA: NORMAL
HBV SURFACE AB SER RIA-ACNC: REACTIVE
HBV SURFACE AG SERPL QL IA: NORMAL

## 2022-01-20 PROCEDURE — 82962 GLUCOSE BLOOD TEST: CPT

## 2022-01-20 PROCEDURE — 86705 HEP B CORE ANTIBODY IGM: CPT | Performed by: INTERNAL MEDICINE

## 2022-01-20 PROCEDURE — 97110 THERAPEUTIC EXERCISES: CPT

## 2022-01-20 PROCEDURE — 99239 HOSP IP/OBS DSCHRG MGMT >30: CPT | Performed by: INTERNAL MEDICINE

## 2022-01-20 PROCEDURE — 87340 HEPATITIS B SURFACE AG IA: CPT | Performed by: INTERNAL MEDICINE

## 2022-01-20 PROCEDURE — 86706 HEP B SURFACE ANTIBODY: CPT | Performed by: INTERNAL MEDICINE

## 2022-01-20 PROCEDURE — 94799 UNLISTED PULMONARY SVC/PX: CPT

## 2022-01-20 RX ADMIN — METOPROLOL TARTRATE 12.5 MG: 25 TABLET, FILM COATED ORAL at 09:33

## 2022-01-20 RX ADMIN — SACUBITRIL AND VALSARTAN 1 TABLET: 24; 26 TABLET, FILM COATED ORAL at 21:54

## 2022-01-20 RX ADMIN — ASPIRIN 81 MG CHEWABLE TABLET 81 MG: 81 TABLET CHEWABLE at 09:33

## 2022-01-20 RX ADMIN — SACUBITRIL AND VALSARTAN 1 TABLET: 24; 26 TABLET, FILM COATED ORAL at 09:34

## 2022-01-20 RX ADMIN — METOPROLOL TARTRATE 12.5 MG: 25 TABLET, FILM COATED ORAL at 21:54

## 2022-01-20 RX ADMIN — METOPROLOL TARTRATE 12.5 MG: 25 TABLET, FILM COATED ORAL at 02:10

## 2022-01-20 RX ADMIN — FAMOTIDINE 40 MG: 20 TABLET ORAL at 09:34

## 2022-01-20 RX ADMIN — CALCIUM ACETATE 1334 MG: 667 CAPSULE ORAL at 21:54

## 2022-01-20 RX ADMIN — CALCIUM ACETATE 1334 MG: 667 CAPSULE ORAL at 09:34

## 2022-01-20 NOTE — SIGNIFICANT NOTE
01/20/22 4868   Plan   Plan Comments Elaine is who provides in house dialysis for [facility]. RUT spoke with Yady with dialysis and they just did not have the staff to provide pt dialysis at the time. They requested pt get dialysis here before she leaves to [facility] today. Orders have been put in and nurse is aware. RUT also notified Yady that pt is covid positive and per Bel with [facility] pt would have to have two members and a portable dialysis unit brought to her room while at [facility]- Yady is working on this to be arranged.

## 2022-01-20 NOTE — SIGNIFICANT NOTE
01/20/22 1037   Plan   Plan Comments SW called and spoke with pts daughter Kerrie. Per Kerrie pt lives at [facility] in [place]. Pt was sent to [facility] for 1 week of rehab and to obtain in house dialysis. Pt has Dialysis M/W/F at [facility] in [place]. SW discussed pt possibly not being able to return to [facility] as she is covid positive as of 1/18. Pts daughter was upset of the news and reported she had not been notified. Daughter was emotional on the phone and expressed she has cancer and would not be able to come see her mom or assist her. We brained stormed options of pt returning to her assisted living home with home health if appropriate and possibly caregiver for the 1-2 that pt would need to be isolated at the facility. SW will reach out to [facility] to confirm if the pt can return or not without completing her quarantine days. SW will also reach out to [facility] regarding pt returning. Daughter requested nurse provide a medical update, SW notified nurse of this request.

## 2022-01-20 NOTE — PLAN OF CARE
Goal Outcome Evaluation:  Progress: no change  Outcome Summary: Pt has had smear to a small bowel movement upon every check throughout the night. Pt has made frequent attempts to get out of the bed and has walked around the room with a one assist multiple times. Pt has only been oriented to self and has become uncooperative when being cleaned due to discomfort.

## 2022-01-20 NOTE — DISCHARGE SUMMARY
[facility]         HOSPITALIST  DISCHARGE SUMMARY    Patient Name: Shakira Hopson  : 1950  MRN: [id number]    Date of Admission: 2022  Date of Discharge:  22  Primary Care Physician: Gopal Umana MD    Consultants:  Nephrology    Discharge Diagnosis:  Life-threatening hyperkalemia  ESRD overdue for dialysis  COVID 19  Dementia with confusion  Diabetes  CHF      Hospital Course     Hospital Course:  Shakira Hopson is a 71 y.o. female who is a chronic dialysis patient in [place] was at acute rehab, difficulty with dialysis staff availability, detected to have an elevated temperature and transferred to the emergency department.  Overdue for dialysis.  Patient with life-threatening hyperkalemia out of 7 receive emergent hemodialysis.  Patient was scheduled have COVID-19 however has been asymptomatic and not required any oxygen supplementation.  Patient medically stable and appropriate discharge back to rehab facility today.  She will receive a dialysis session prior to transfer today.    -Currently asymptomatic from COVID-19 and does not warrant further treatment.    DISCHARGE Follow Up Recommendations:   Hemodialysis  Transfer to [facility]      Day of Discharge     Vital Signs:  Temp:  [97.4 °F (36.3 °C)-97.8 °F (36.6 °C)] 97.7 °F (36.5 °C)  Heart Rate:  [69-80] 78  Resp:  [18] 18  BP: (130-147)/(50-73) 145/73  Physical Exam:   Gen: NAD, WDWN  Resp: no dyspnea  CV: no LE edema  GI: Abdomen soft +bs  Psych: Alert    Discharge Details        Discharge Medications      Continue These Medications      Instructions Start Date   acetaminophen 325 MG tablet  Commonly known as: TYLENOL   650 mg, Oral, Every 4 Hours PRN      aspirin 81 MG chewable tablet   81 mg, Oral, Daily      atorvastatin 40 MG tablet  Commonly known as: LIPITOR   40 mg, Oral, Daily      calcium acetate 667 MG capsule capsule  Commonly known as: PHOS BINDER)   1,334 mg, Oral, 3 Times Daily       Canagliflozin 100 MG tablet tablet  Commonly known as: INVOKANA   100 mg, Oral, Daily      epoetin saba 96507 UNIT/ML injection  Commonly known as: EPOGEN,PROCRIT   40,000 Units, Subcutaneous, Every 7 Days      hydrOXYzine pamoate 25 MG capsule  Commonly known as: VISTARIL   25 mg, Oral, Every 6 Hours PRN      insulin aspart 100 UNIT/ML solution pen-injector sc pen  Commonly known as: novoLOG FLEXPEN   Subcutaneous, 3 Times Daily With Meals, Sliding scale      metoprolol tartrate 25 MG tablet  Commonly known as: LOPRESSOR   12.5 mg, Oral, Every 8 Hours      POLYETHYLENE GLYCOL 3350 PO   17 g, Oral, Daily      sacubitril-valsartan 24-26 MG tablet  Commonly known as: ENTRESTO   1 tablet, Oral, 2 Times Daily      traMADol 50 MG tablet  Commonly known as: ULTRAM   50 mg, Oral, Daily PRN             Discharge Disposition:   Rehab Facility or Unit (DC - External)    Discharge Condition: Stable    Diet:  Hospital:  Diet Order   Procedures   • Diet Regular; Cardiac, Consistent Carbohydrate, Renal       Discharge Activity: Activity at rehab      No future appointments.        Pertinent  and/or Most Recent Results     PROCEDURES:   Dialysis    LAB RESULTS:      Lab 01/19/22  1013 01/18/22  1452 01/18/22  1443 01/17/22  0430 01/15/22  0500   WBC 4.68  --  6.38 6.40 7.13   HEMOGLOBIN 9.3*  --  9.5* 9.7* 9.1*   HEMATOCRIT 30.6*  --  31.0* 32.9* 29.9*   PLATELETS 268  --  475* 499* 457*   NEUTROS ABS 2.88  --  4.40 3.90 4.67   IMMATURE GRANS (ABS) 0.01  --  0.02 0.02 0.02   LYMPHS ABS 1.19  --  1.16 1.51 1.50   MONOS ABS 0.53  --  0.61 0.70 0.69   EOS ABS 0.06  --  0.16 0.23 0.22   MCV 97.1*  --  97.8* 100.3* 97.1*   LACTATE  --  1.0  --   --   --          Lab 01/19/22  1012 01/18/22  1443 01/17/22  0430 01/15/22  0500   SODIUM 138 134* 137 135*   POTASSIUM 5.1 7.1* 6.0* 4.6   CHLORIDE 102 97* 97* 95*   CO2 25.0 21.9* 25.4 27.8   ANION GAP 11.0 15.1* 14.6 12.2   BUN 21 72* 51* 23   CREATININE 4.35* 8.36* 7.03* 3.48*   GLUCOSE  113* 101* 95 75   CALCIUM 8.5* 8.7 8.6 9.1   MAGNESIUM 1.8 1.9 1.8  --    PHOSPHORUS 3.8  --  6.3*  --          Lab 01/18/22  1443 01/17/22  0430 01/15/22  0500   TOTAL PROTEIN 8.2 7.7 7.4   ALBUMIN 3.00* 2.90* 2.80*   GLOBULIN 5.2 4.8 4.6   ALT (SGPT) 18 26 10   AST (SGOT) 28 38* 17   BILIRUBIN 0.2 0.3 0.2   ALK PHOS 81 90 69         Lab 01/18/22  1443 01/15/22  0500   PROBNP  --  >70,000.0*   TROPONIN T 0.247*  --              Lab 01/19/22  1012   IRON 16*   IRON SATURATION 9*   TIBC 179*   TRANSFERRIN 120*         Brief Urine Lab Results  (Last result in the past 365 days)      Color   Clarity   Blood   Leuk Est   Nitrite   Protein   CREAT   Urine HCG        01/16/22 1600 Yellow   Clear   Trace   Small (1+)   Positive   >=300 mg/dL (3+)               Microbiology Results (last 10 days)     Procedure Component Value - Date/Time    COVID-19,APTIMA PANTHER(DES),[facility], NP/OP SWAB IN UTM/VTM/SALINE TRANSPORT MEDIA,24 HR TAT - Swab, Nasopharynx [[id number]]  (Abnormal) Collected: 01/18/22 1620    Lab Status: Final result Specimen: Swab from Nasopharynx Updated: 01/18/22 2114     COVID19 Detected    Narrative:      Fact sheet for providers: https://www.fda.gov/media/096277/download     Fact sheet for patients: https://www.fda.gov/media/846735/download    Test performed by RT PCR.    Blood Culture - Blood, Arm, Left [[id number]]  (Normal) Collected: 01/18/22 1443    Lab Status: Preliminary result Specimen: Blood from Arm, Left Updated: 01/20/22 1500     Blood Culture No growth at 2 days    Urine Culture - Urine, Urine, Clean Catch [[id number]]  (Normal) Collected: 01/16/22 1600    Lab Status: Final result Specimen: Urine, Clean Catch Updated: 01/17/22 1745     Urine Culture No growth    Clostridium Difficile Toxin, PCR - Stool, Per Rectum [[id number]] Collected: 01/16/22 1200    Lab Status: Final result Specimen: Stool from Per Rectum Updated: 01/16/22 1435     C. Difficile Toxins by PCR Negative     027 Toxin  Presumptive Negative          XR Chest 1 View    Result Date: 1/18/2022  Impression:   1. Findings consistent with multifocal pneumonia, right much greater than left.  Differential includes typical and atypical pneumonia.       JAVIER MENDEZ MD       Electronically Signed and Approved By: JAVIER MENDEZ MD on 1/18/2022 at 14:41                   Time spent on Discharge including face to face service: Greater than 35 minutes    Electronically signed by Vilma Elizondo MD, 01/20/22, 3:36 PM EST.

## 2022-01-20 NOTE — PROGRESS NOTES
[facility]     Progress Note    Patient Name: Shakira Hopson  : 1950  MRN: [id number]  Primary Care Physician:  Gopal Umana MD  Date of admission: 2022  1:52 PM       Subjective     She is awake and alert this a.m., but very confused.  Plan is for her to return to rehab today if dialysis will confirm they will be able to do her [facility] at [facility].    Review of Systems:    Review of Systems    Difficult to obtain as she is confused    Objective   Objective     Vitals:   Vitals:    22 1925 22 2300 22 0255 22 0700   BP: 130/50 147/71 144/70 146/53   BP Location: Left arm Left arm Right arm Right arm   Patient Position: Lying Lying Lying Lying   Pulse: 80 73 77 69   Resp:    Temp: 97.8 °F (36.6 °C) 97.7 °F (36.5 °C) 97.6 °F (36.4 °C) 97.4 °F (36.3 °C)   TempSrc: Oral Oral Oral Oral   SpO2: 98% 96% 97% 98%   Weight:       Height:              Physical Exam:  Vitals and nursing note reviewed.     Constitutional:      Alert,Confused responsive, and conversant.  No acute distress.     HENT:      Normocephalic and atraumatic, no nasal congestion, discharge, mucous membranes are moist, no OP erythema  Eyes:      PERRLA, no scleral icterus, no conjunctival injection, no eye discharge  Neck:     Supple, no thyromegaly, no lymphadenopathy, trachea midline, no elevated JVD  Cardiovascular:      RRR, no murmurs, rubs or gallops. Palpable pedal pulses bilaterally. No BLE edema   Pulmonary:      CTAB. Pulmonary effort is normal and unlabored. No respiratory distress.    Abdominal:      Bowel sounds active, soft, nontender, non distended, no organomegaly  Musculoskeletal:         No muscle wasting, tenderness or joint swelling.  Generalized weakness.  Skin:     Warm and dry, cap refill less than 3 seconds. No clubbing, cyanosis, jaundice, erythema, rashes or ecchymosis.   Neurological:      Confused, speech clear, no focal deficit, strength equal bilaterally in all  extremities, cranial nerves grossly intact       Result Review    Result Review:  I have personally reviewed the results from the time of this admission to 9/20/2021 18:13 EDT and agree with these findings:  [x]  Laboratory  [x]  Microbiology  [x]  Radiology  [x]  EKG/Telemetry   [x]  Cardiology/Vascular   []  Pathology  []  Old records  []  Other:     Assessment/Plan   Assessment / Plan       Active Hospital Problems:  Active Hospital Problems    Diagnosis    • Pneumonia due to COVID-19 virus    • ESRD (end stage renal disease) on dialysis (HCC)    • Hyperkalemia    • Pneumonia          Plan:   Hemodialysis today prior to discharge   I have reviewed the information and agree with the plan      Electronically signed by ROCK Ferguson, 01/20/22, 11:49 AM EST.

## 2022-01-20 NOTE — SIGNIFICANT NOTE
01/20/22 1058   Plan   Plan Comments RUT spoke with Britni from [facility]. Per Britni pt is Max assistance and she is was requiring more care than what they can provide prior to pt going to inpatient rehab. Pt was to go to rehab to hopefully be able to be more independent. Britni stated they have covid at their facility and their policy is if a patient is indepedent they can return and isolate in their room. However, pt is not independent and would not be able to return with home health or a caregiver. RUT will follow up with pts daughter regarding SNF. [facility] does have a covid unit, we will check to see if they have any bed availability.

## 2022-01-20 NOTE — SIGNIFICANT NOTE
01/20/22 1117   Plan   Plan Comments Per Bel with [facility] she reached out to Infection Control and pt is in her 3 day window, if she were to discharge back to their facility today she would not need to quarantine. If pt does not discharge today then they would have to re-evaluate and pt would be required to complete 10 days of quarantine. RUT discussed with MD. Per MD pt is medically ready. MD requested that we confirm [facility] would be providing in house dialysis while there as they did not have their dialysis team for a few days and pt did not receive treatment. RUT will follow up with facility to confirm this. Pt will discharge back to [facility] today. RUT will call and notify pts daughter.   Final Discharge Disposition Code 62 - inpatient rehab facility

## 2022-01-20 NOTE — THERAPY TREATMENT NOTE
Acute Care - Physical Therapy Treatment Note   [facility]     Patient Name: Shakira Hopson  : 1950  MRN: [id number]  Today's Date: 2022      Visit Dx:     ICD-10-CM ICD-9-CM   1. Hyperkalemia  E87.5 276.7   2. Pneumonia of both lungs due to infectious organism, unspecified part of lung  J18.9 483.8   3. Chronic kidney disease with end stage renal failure on dialysis (HCC)  N18.6 585.6    Z99.2 V45.11   4. Decreased activities of daily living (ADL)  Z78.9 V49.89   5. Difficulty in walking  R26.2 719.7     Patient Active Problem List   Diagnosis   • ESRD (end stage renal disease) on dialysis ([facility])   • Hyperkalemia   • Pneumonia   • Pneumonia due to COVID-19 virus     Past Medical History:   Diagnosis Date   • Anemia    • CHF (congestive heart failure) ([facility])    • Coronary artery disease    • Dementia ([facility])    • Diabetes mellitus ([facility])    • End stage renal disease ([facility])    • Hemodialysis patient ([facility])    • Hyperlipidemia    • Hypertension    • Myocardial infarction ([facility])    • Pneumonia      History reviewed. No pertinent surgical history.  PT Assessment (last 12 hours)     PT Evaluation and Treatment     Row Name 22 3878          Physical Therapy Time and Intention    Subjective Information no complaints  -DK     Document Type therapy note (daily note)  -DK     Mode of Treatment individual therapy; physical therapy  -DK     Patient Effort adequate  -DK     Symptoms Noted During/After Treatment none  -DK     Comment --  Pt gets easily agitated.  -DK     Row Name 22 3190          Cognition    Affect/Mental Status (Cognitive) confused; agitated  -DK     Orientation Status (Cognition) oriented to; person  -DK     Follows Commands (Cognition) WFL  -DK     Cognitive Function (Cognitive) WFL  -DK     Personal Safety Interventions nonskid shoes/slippers when out of bed; supervised activity  -DK     Row Name 22 1050          Pain Scale: Word Pre/Post-Treatment    Pain: Word Scale, Pretreatment 0 - no  pain  -DK     Posttreatment Pain Rating 0 - no pain  -DK     Row Name 01/20/22 1059          Motor Skills    Motor Skills therapeutic exercise  -DK     Coordination WFL  -     Therapeutic Exercise hip; knee; ankle  -DK     Additional Documentation --  No transfers due to pt confusion, mild agitation.  -     Row Name 01/20/22 1059          Hip (Therapeutic Exercise)    Hip (Therapeutic Exercise) AAROM (active assistive range of motion)  -     Hip AAROM (Therapeutic Exercise) bilateral; flexion; extension; aBduction; aDduction; supine; 10 repetitions; 2 sets  -     Row Name 01/20/22 1059          Knee (Therapeutic Exercise)    Knee (Therapeutic Exercise) AAROM (active assistive range of motion)  -     Knee AAROM (Therapeutic Exercise) bilateral; flexion; extension; supine; 10 repetitions; 2 sets  -     Row Name 01/20/22 1059          Ankle (Therapeutic Exercise)    Ankle (Therapeutic Exercise) AAROM (active assistive range of motion)  -     Ankle AAROM (Therapeutic Exercise) bilateral; dorsiflexion; plantarflexion; supine; 10 repetitions; 2 sets  -     Row Name             Wound 01/18/22 1815 Other (See comments) coccyx Pressure Injury    Wound - Properties Group Placement Date: 01/18/22  -SB Placement Time: 1815  -SB Present on Hospital Admission: Y  -SB Side: Other (See comments)  -SB, coccyx  Location: coccyx  -SB Primary Wound Type: Pressure inj  -SB Stage, Pressure Injury : Stage 2  -SB     Retired Wound - Properties Group Date first assessed: 01/18/22  -SB Time first assessed: 1815  -SB Present on Hospital Admission: Y  -SB Side: Other (See comments)  -SB, coccyx  Location: coccyx  -SB Primary Wound Type: Pressure inj  -SB     Row Name 01/20/22 1059          Plan of Care Review    Plan of Care Reviewed With patient  -DK     Progress no change  -     Row Name 01/20/22 1059          Positioning and Restraints    Pre-Treatment Position in bed  -DK     Post Treatment Position bed  -DK     In Bed  supine; call light within reach; encouraged to call for assist; exit alarm on; side rails up x3  -DK     Row Name 01/20/22 1059          Therapy Assessment/Plan (PT)    Rehab Potential (PT) fair, will monitor progress closely  -     Criteria for Skilled Interventions Met (PT) skilled treatment is necessary  -DK     Problem List (PT) problems related to; balance; cognition; mobility; strength; hearing  -DK     Activity Limitations Related to Problem List (PT) unable to ambulate safely; unable to transfer safely  -DK     Row Name 01/20/22 1059          Progress Summary (PT)    Progress Toward Functional Goals (PT) progress toward functional goals is fair  -           User Key  (r) = Recorded By, (t) = Taken By, (c) = Cosigned By    Initials Name Provider Type    Jacquelyn Gerard PTA Physical Therapy Assistant    Palak Maier RNA Registered Nurse                Physical Therapy Education                 Title: PT OT SLP Therapies (In Progress)     Topic: Physical Therapy (In Progress)     Point: Mobility training (In Progress)     Learning Progress Summary           Patient Acceptance, E, NR by MATTHEW at 1/19/2022 1421                   Point: Precautions (In Progress)     Learning Progress Summary           Patient Acceptance, E, NR by MATTHEW at 1/19/2022 1421                               User Key     Initials Effective Dates Name Provider Type Discipline    MATTHEW 06/03/21 -  Ok Stroud, PT Physical Therapist PT              PT Recommendation and Plan  Planned Therapy Interventions (PT): balance training, bed mobility training, gait training, strengthening, transfer training  Therapy Frequency (PT): daily  Progress Summary (PT)  Progress Toward Functional Goals (PT): progress toward functional goals is fair  Plan of Care Reviewed With: patient  Progress: no change   Outcome Measures     Row Name 01/20/22 1059 01/19/22 1421          How much help from another person do you currently need...    Turning from  your back to your side while in flat bed without using bedrails? 3  -DK 2  -MATTHEW     Moving from lying on back to sitting on the side of a flat bed without bedrails? 3  -DK 2  -MATTHEW     Moving to and from a bed to a chair (including a wheelchair)? 2  -DK 1  -MATTHEW     Standing up from a chair using your arms (e.g., wheelchair, bedside chair)? 2  -DK 1  -MATTHEW     Climbing 3-5 steps with a railing? 1  -DK 1  -MATTHEW     To walk in hospital room? 1  -DK 1  -MATTHEW     AM-PAC 6 Clicks Score (PT) 12  -DK 8  -MATTHEW            Functional Assessment    Outcome Measure Options AM-PAC 6 Clicks Basic Mobility (PT)  -DK AM-PAC 6 Clicks Basic Mobility (PT)  -MATTHEW           User Key  (r) = Recorded By, (t) = Taken By, (c) = Cosigned By    Initials Name Provider Type    Jacquelyn Gerard PTA Physical Therapy Assistant    Ok Penny, PT Physical Therapist                 Time Calculation:    PT Charges     Row Name 01/20/22 1104             Time Calculation    PT Received On 01/20/22  -DK      PT Goal Re-Cert Due Date 01/28/22  -DK              Timed Charges    02166 - PT Therapeutic Exercise Minutes 14  -DK      42930 - PT Therapeutic Activity Minutes 3  -DK              Total Minutes    Timed Charges Total Minutes 17  -DK       Total Minutes 17  -DK            User Key  (r) = Recorded By, (t) = Taken By, (c) = Cosigned By    Initials Name Provider Type    Jacquelyn Gerard PTA Physical Therapy Assistant              Therapy Charges for Today     Code Description Service Date Service Provider Modifiers Qty    [id number]  PT THER PROC EA 15 MIN 1/20/2022 Jacquelyn Daugherty PTA GP 1          PT G-Codes  Outcome Measure Options: AM-PAC 6 Clicks Basic Mobility (PT)  AM-PAC 6 Clicks Score (PT): 12  AM-PAC 6 Clicks Score (OT): 12    Jacquelyn Daugherty PTA  1/20/2022

## 2022-01-23 LAB — BACTERIA SPEC AEROBE CULT: NORMAL

## 2022-02-04 NOTE — PROGRESS NOTES
" [facility]     Progress Note    Patient Name: Shakira Hopson  : 1950  MRN: [id number]  Primary Care Physician:  Gopal Umana MD  Date of admission: 2022  1:52 PM       Subjective     Patient reports she is feeling a little better today but complains of shortness of breath and diarrhea.  She cannot tell me her last episode of diarrhea though.  She is awake But confused and disoriented    Review of Systems:    Review of Systems   Constitutional: Positive for fatigue.   Respiratory: Positive for shortness of breath and wheezing.    Gastrointestinal: Positive for diarrhea.   All other systems reviewed and are negative.          Objective   Objective     Vitals:   Vitals:    22 1827 22 1928 22 0233   BP: 149/70   118/41   BP Location: Left arm   Left arm   Patient Position: Lying   Lying   Pulse: 94 92  103   Resp: 16 16 16 16   Temp: 99.5 °F (37.5 °C)  99.4 °F (37.4 °C) 98.8 °F (37.1 °C)   TempSrc: Oral  Oral Oral   SpO2: 95% 94%  96%   Weight: 48.8 kg (107 lb 9.4 oz)      Height: 167.6 cm (66\")             Physical Exam:  Vitals and nursing note reviewed.     Constitutional:        Alert and lethargic  HENT:      Normocephalic and atraumatic, no nasal congestion, discharge, mucous membranes are moist, no OP erythema    Cardiovascular:      RRR, no murmurs, rubs or gallops. Palpable pedal pulses bilaterally. No BLE edema.   Pulmonary:     Wheezes noted and on room airAbdominal:      Bowel sounds active, soft, nontender, non distended, no organomegaly  Musculoskeletal:         No muscle wasting, tenderness or joint swelling.  .  Skin:     Warm and dry, cap refill less than 3 seconds. No clubbing, cyanosis, jaundice, erythema, rashes or ecchymosis.            Result Review    Result Review:  I have personally reviewed the results from the time of this admission to 2021 18:13 EDT and agree with these findings:  [x]  Laboratory  [x]  Microbiology  []  " Radiology  []  EKG/Telemetry   []  Cardiology/Vascular   []  Pathology  []  Old records  []  Other:     Assessment/Plan   Assessment / Plan       Active Hospital Problems:  Active Hospital Problems    Diagnosis    • Pneumonia due to COVID-19 virus    • ESRD (end stage renal disease) on dialysis (HCC)    • Hyperkalemia    • Pneumonia          Plan:   HD yesterday with 1L removed    Patient has a history of hyperkalemia and order lokelma after lab draw if indicated    Continue plan of care per primary and will order HD tomorrow   I have personally seen the patient reviewed the data and agree with the plan  There were no labs available this morning  Electronically signed by ROCK Phelps, 01/19/22, 8:50 AM EST.   92